# Patient Record
Sex: FEMALE | Race: BLACK OR AFRICAN AMERICAN | NOT HISPANIC OR LATINO
[De-identification: names, ages, dates, MRNs, and addresses within clinical notes are randomized per-mention and may not be internally consistent; named-entity substitution may affect disease eponyms.]

---

## 2017-01-23 PROBLEM — Z00.00 ENCOUNTER FOR PREVENTIVE HEALTH EXAMINATION: Noted: 2017-01-23

## 2017-01-26 ENCOUNTER — APPOINTMENT (OUTPATIENT)
Dept: PULMONOLOGY | Facility: CLINIC | Age: 82
End: 2017-01-26

## 2017-07-06 ENCOUNTER — HOSPITAL ENCOUNTER (OUTPATIENT)
Dept: HOSPITAL 74 - JER | Age: 82
Setting detail: OBSERVATION
LOS: 3 days | Discharge: HOME | End: 2017-07-09
Attending: INTERNAL MEDICINE | Admitting: INTERNAL MEDICINE
Payer: COMMERCIAL

## 2017-07-06 VITALS — BODY MASS INDEX: 20.9 KG/M2

## 2017-07-06 DIAGNOSIS — N39.0: ICD-10-CM

## 2017-07-06 DIAGNOSIS — M32.9: ICD-10-CM

## 2017-07-06 DIAGNOSIS — Z79.82: ICD-10-CM

## 2017-07-06 DIAGNOSIS — R06.00: ICD-10-CM

## 2017-07-06 DIAGNOSIS — I50.32: ICD-10-CM

## 2017-07-06 DIAGNOSIS — I35.0: ICD-10-CM

## 2017-07-06 DIAGNOSIS — M19.90: ICD-10-CM

## 2017-07-06 DIAGNOSIS — R06.02: ICD-10-CM

## 2017-07-06 DIAGNOSIS — Z86.73: ICD-10-CM

## 2017-07-06 DIAGNOSIS — J84.89: ICD-10-CM

## 2017-07-06 DIAGNOSIS — R07.9: ICD-10-CM

## 2017-07-06 DIAGNOSIS — R55: Primary | ICD-10-CM

## 2017-07-06 DIAGNOSIS — I10: ICD-10-CM

## 2017-07-06 DIAGNOSIS — E03.9: ICD-10-CM

## 2017-07-06 LAB
ALBUMIN SERPL-MCNC: 3.4 G/DL (ref 3.4–5)
ALP SERPL-CCNC: 99 U/L (ref 45–117)
ALT SERPL-CCNC: 15 U/L (ref 12–78)
ANION GAP SERPL CALC-SCNC: 10 MMOL/L (ref 8–16)
APPEARANCE UR: CLEAR
AST SERPL-CCNC: 28 U/L (ref 15–37)
BACTERIA #/AREA URNS HPF: (no result) /HPF
BASOPHILS # BLD: 2.2 % (ref 0–2)
BILIRUB SERPL-MCNC: 0.7 MG/DL (ref 0.2–1)
BILIRUB UR STRIP.AUTO-MCNC: NEGATIVE MG/DL
CALCIUM SERPL-MCNC: 8.3 MG/DL (ref 8.5–10.1)
CO2 SERPL-SCNC: 25 MMOL/L (ref 21–32)
COLOR UR: YELLOW
CREAT SERPL-MCNC: 0.9 MG/DL (ref 0.55–1.02)
DEPRECATED RDW RBC AUTO: 19.9 % (ref 11.6–15.6)
EOSINOPHIL # BLD: 0.3 % (ref 0–4.5)
GLUCOSE SERPL-MCNC: 79 MG/DL (ref 74–106)
INR BLD: 1.14 (ref 0.82–1.09)
KETONES UR QL STRIP: NEGATIVE
LEUKOCYTE ESTERASE UR QL STRIP.AUTO: NEGATIVE
MCH RBC QN AUTO: 25.6 PG (ref 25.7–33.7)
MCHC RBC AUTO-ENTMCNC: 33.7 G/DL (ref 32–36)
MCV RBC: 75.9 FL (ref 80–96)
MUCOUS THREADS URNS QL MICRO: (no result)
NEUTROPHILS # BLD: 80 % (ref 42.8–82.8)
NITRITE UR QL STRIP: POSITIVE
PH UR: 6 [PH] (ref 5–8)
PLATELET # BLD AUTO: 229 K/MM3 (ref 134–434)
PMV BLD: 9.2 FL (ref 7.5–11.1)
PROT SERPL-MCNC: 9.2 G/DL (ref 6.4–8.2)
PROT UR QL STRIP: NEGATIVE
PROT UR QL STRIP: NEGATIVE
PT PNL PPP: 12.6 SEC (ref 9.98–11.88)
RBC # BLD AUTO: 1 /HPF (ref 0–3)
RBC # UR STRIP: NEGATIVE /UL
SP GR UR: 1.02 (ref 1–1.02)
TROPONIN I SERPL-MCNC: < 0.02 NG/ML (ref 0–0.05)
UROBILINOGEN UR STRIP-MCNC: (no result) E.U./DL (ref 0.2–1)
WBC # BLD AUTO: 4.9 K/MM3 (ref 4–10)
WBC # UR AUTO: 1 /HPF (ref 3–5)

## 2017-07-06 PROCEDURE — G0378 HOSPITAL OBSERVATION PER HR: HCPCS

## 2017-07-06 NOTE — PDOC
History of Present Illness





<Anthony Taveras - Last Filed: 07/06/17 18:14>





- General


History Source: Patient, Family


Exam Limitations: No Limitations





- History of Present Illness


Initial Comments: 


07/06/17 16:51


The patient is an 83 year old female with a significant past medical history of 

hypertension, lupus, aortic stenosis, osteoarthritis, hypothyroidism, mild 

stroke, and interstitial lung disease, sent by PCP to the Emergency Department 

with presyncopal episode this morning. The patient reports that she woke up 

this morning, walked to the bathroom and felt as if she was going to pass out

. The patient reports that she got back into bed and experienced palpitations 

and shortness of breath. She admits that walking back to bed she had an 

unsteady gait. The patient reports that she has had night sweats for the past 

week. She also reports foul smelling urine. As per the patients daughter, the 

patient looked pale this morning. The patients daughter admits that the 

patient has a history of recurrent UTIs, most recently 2-3 months ago. The 

patient admits that she went to see Dr. Pineda at 11am after the incident, who 

was suspicious of an acute COPD exacerbation and suggested she come to the ED.





The patient denies fever, or cough. Patient denies chest pain. Patient denies 

dysuria, or urinary frequency. Patient denies loss of consciousness, or head 

trauma. Patient denies nausea, vomiting, and diarrhea.





PCP: Dr. Blaise Pineda


Cardio: Dr. Uribe








<Joselyn Segura - Last Filed: 07/06/17 18:21>





- General


Chief Complaint: Shortness of Breath


Stated Complaint: SOB (PCP SENT)


Time Seen by Provider: 07/06/17 15:48





Past History





- Past Medical History


Anemia: No


Asthma: No


Cancer: No


Cardiac Disorders: No


CVA: No


COPD: No


CHF: No


Dementia: No


Diabetes: No


GI Disorders: No


 Disorders: No


HTN: Yes


Hypercholesterolemia: No


Liver Disease: No


Seizures: No


Thyroid Disease: Yes


Lung CA: No (interstitial lung disease)


Other medical history: lupus, aortic stenosis





- Immunization History


Immunization Up to Date: Yes





- Psycho/Social/Smoking Cessation Hx


Suicidal Ideation: No


Smoking History: Never smoked


Have you smoked in the past 12 months: No


Hx Alcohol Use: No


Drug/Substance Use Hx: No


Substance Use Type: None





<Anthony Taveras - Last Filed: 07/06/17 18:14>





<Joselyn Segura - Last Filed: 07/06/17 18:21>





- Past Medical History


Allergies/Adverse Reactions: 


 Allergies











Allergy/AdvReac Type Severity Reaction Status Date / Time


 


No Known Allergies Allergy   Unverified 07/06/17 15:43











Home Medications: 


Ambulatory Orders





Aspirin [ASA -] 81 mg PO DAILY 11/01/16 


Diltiazem [Cardizem -] 180 mg PO BID 11/01/16 


Donepezil HCl 5 mg PO DAILY 11/01/16 


Furosemide 60 mg PO DAILY 11/01/16 


Hydralazine HCl 50 mg PO TID 11/01/16 


Isosorbide Mononitrate [Imdur -] 60 mg PO BID 11/01/16 


Levothyroxine [Synthroid -] 50 mcg PO DAILY 11/01/16 


Losartan Potassium 100 mg PO DAILY 11/01/16 


Hydroxychloroquine So4 [Plaquenil -] 200 mg PO DAILY 07/06/17 


Risedronate Sodium 150 mg PO MONTHLY 07/06/17 











**Review of Systems





- Review of Systems


Able to Perform ROS?: Yes


Comments:: 


07/06/17 16:52


GENERAL/CONSTITUTIONAL: + night sweats, + unsteady gait. No fever. No weakness.


HEAD, EYES, EARS, NOSE AND THROAT: No change in vision. No ear pain or 

discharge. No sore throat.


CARDIOVASCULAR: + palpitations, + shortness of breath. No chest pain.


RESPIRATORY: No cough, wheezing, or hemoptysis.


GASTROINTESTINAL: No nausea, vomiting, diarrhea or constipation.


GENITOURINARY: + foul smelling urine. No dysuria, or frequency.


MUSCULOSKELETAL: No joint or muscle swelling or pain. No neck or back pain.


SKIN: No rash


NEUROLOGIC: No headache, vertigo, loss of consciousness, or change in strength/

sensation.


ENDOCRINE: No increased thirst. No abnormal weight change.


HEMATOLOGIC/LYMPHATIC: No anemia, easy bleeding, or history of blood clots.


ALLERGIC/IMMUNOLOGIC: No hives or skin allergy. 





<Joselyn Segura - Last Filed: 07/06/17 18:21>





*Physical Exam





- Vital Signs


 Last Vital Signs











Temp Pulse Resp BP Pulse Ox


 


 97.1 F L  68   22   147/66   97 


 


 07/06/17 15:43  07/06/17 15:43  07/06/17 15:43  07/06/17 15:43  07/06/17 15:43














<Anthony Taveras - Last Filed: 07/06/17 18:14>





- Vital Signs


 Last Vital Signs











Temp Pulse Resp BP Pulse Ox


 


 97.1 F L  68   22   147/66   100 


 


 07/06/17 15:43  07/06/17 15:43  07/06/17 15:43  07/06/17 15:43  07/06/17 16:10














- Physical Exam


Comments: 


07/06/17 16:54


GENERAL: Awake, alert, and fully oriented, in no acute distress


HEAD: No signs of trauma


EYES: PERRLA, EOMI, sclera anicteric, conjunctiva clear


ENT: Auricles normal inspection, hearing grossly normal, nares patent, 

oropharynx clear without exudates. Moist mucosa


NECK: Normal ROM, supple, no lymphadenopathy, JVD, or masses


LUNGS: Breath sounds equal, clear to auscultation bilaterally.  No wheezes, and 

no crackles


HEART: Systolic murmur, regular rate and rhythm, normal S1 and S2, no murmurs, 

rubs or gallops


ABDOMEN: Soft, nontender, normoactive bowel sounds.  No guarding, no rebound.  

No masses


EXTREMITIES: Normal range of motion, no edema.  No clubbing or cyanosis. No 

cords, erythema, or tenderness


NEUROLOGICAL: Cranial nerves II through XII grossly intact.  Normal speech


SKIN: Warm, Dry, normal turgor, no rashes or lesions noted.





<Joselyn Segura - Last Filed: 07/06/17 18:21>





ED Treatment Course





- LABORATORY


CBC & Chemistry Diagram: 


 07/06/17 16:50





 07/06/17 16:50





<Anthony Taveras - Last Filed: 07/06/17 18:14>





- LABORATORY


CBC & Chemistry Diagram: 


 07/06/17 16:50





 07/06/17 16:50





- RADIOLOGY


Radiograph Interpretation: 





07/06/17 17:41


Chest XRay


As reviewed by Dr. Maurice Buitrago


IMPRESSION: Since prior chest xray, the cardiac silhouette remains slightly to 

moderately enlarged with unfolding of the aortic arch. Bilateral increased 

interstitial lung markings are again seen mainly involving the lower lobes. 

Superimposed infiltrates cannot be excluded. Mediastinum and visualized osseous 

structures appear intact. 





<Joselyn Segura - Last Filed: 07/06/17 18:21>





Medical Decision Making





- Medical Decision Making


07/06/17 18:06


Dr. Pineda was called at his office at 6:07. Awaiting call back.





07/06/17 18:11


Dr. Franklin returned call and spoke to Dr. Taveras about the patient's care. Dr. Franklin agreed to admit for chest pain, to admits under Tornillo's service, and 

Dr. Franklin will input orders. 





<Joselyn Segura - Last Filed: 07/06/17 18:21>





*DC/Admit/Observation/Transfer





- Discharge Dispostion


Admit: Yes





- Attestations


Physician Attestion: 





07/06/17 15:50








I, Dr. Anthony Taveras, attest that this document has been prepared under my 

direction and personally reviewed by me in its entirety.   I further attest, 

that it accurately reflects all work, treatment, procedures and medical decision

-making performed by me.  





<Anthony Taveras - Last Filed: 07/06/17 18:14>





- Attestations


Scribe Attestion: 


07/06/17 16:55


Documentation prepared by Joselyn Segura, acting as medical scribe for Anthony Taveras DO.





<Joselyn Segura - Last Filed: 07/06/17 18:21>


Diagnosis at time of Disposition: 


 Syncope, near, Hypothyroid





Chest pain


Qualifiers:


 Chest pain type: unspecified Qualified Code(s): R07.9 - Chest pain, unspecified





Dyspnea


Qualifiers:


 Dyspnea type: shortness of breath Qualified Code(s): R06.02 - Shortness of 

breath





- Discharge Dispostion


Condition at time of disposition: Unchanged/Unknown





- Referrals


Referrals: 


Blaise Pineda MD [Primary Care Provider] -

## 2017-07-07 RX ADMIN — HYDRALAZINE HYDROCHLORIDE SCH MG: 50 TABLET, FILM COATED ORAL at 13:48

## 2017-07-07 RX ADMIN — Medication SCH TAB: at 17:17

## 2017-07-07 RX ADMIN — CEFTRIAXONE SCH MLS/HR: 1 INJECTION, POWDER, FOR SOLUTION INTRAMUSCULAR; INTRAVENOUS at 17:17

## 2017-07-07 RX ADMIN — DILTIAZEM HYDROCHLORIDE SCH MG: 60 TABLET, FILM COATED ORAL at 09:55

## 2017-07-07 RX ADMIN — ISOSORBIDE MONONITRATE SCH MG: 60 TABLET, EXTENDED RELEASE ORAL at 21:08

## 2017-07-07 RX ADMIN — LEVOTHYROXINE SODIUM SCH MCG: 50 TABLET ORAL at 06:29

## 2017-07-07 RX ADMIN — HYDRALAZINE HYDROCHLORIDE SCH MG: 50 TABLET, FILM COATED ORAL at 06:29

## 2017-07-07 RX ADMIN — HYDROXYCHLOROQUINE SULFATE SCH MG: 200 TABLET, FILM COATED ORAL at 09:56

## 2017-07-07 RX ADMIN — DONEPEZIL HYDROCHLORIDE SCH MG: 5 TABLET, FILM COATED ORAL at 21:07

## 2017-07-07 RX ADMIN — ISOSORBIDE MONONITRATE SCH MG: 60 TABLET, EXTENDED RELEASE ORAL at 09:55

## 2017-07-07 RX ADMIN — DILTIAZEM HYDROCHLORIDE SCH MG: 60 TABLET, FILM COATED ORAL at 21:07

## 2017-07-07 RX ADMIN — ASPIRIN 81 MG SCH MG: 81 TABLET ORAL at 09:55

## 2017-07-07 RX ADMIN — HYDRALAZINE HYDROCHLORIDE SCH MG: 50 TABLET, FILM COATED ORAL at 21:07

## 2017-07-07 RX ADMIN — FUROSEMIDE SCH MG: 20 TABLET ORAL at 09:55

## 2017-07-07 RX ADMIN — LOSARTAN POTASSIUM SCH MG: 50 TABLET, FILM COATED ORAL at 09:55

## 2017-07-07 NOTE — HP
Admitting History and Physical





- Primary Care Physician


PCP: Blaise Pineda





- Admission


Chief Complaint: I felt weak


History of Present Illness: 


Ms Espinosa is a pleasant 83 year old female who comes in with lightheadedness. 

According to patient and daughter, she has non-specific complaints of not 

feeling well since 7/4. Patient cannot describe exactly what that meant earlier 

this week, but yesterday she says she began to feel very weak. She says she got 

up to make tea and began to feel lightheaded. She was able to make it back to 

the bed without falling, but she says she was weak as well. She was also short 

of breath. Her daughter saw her and felt her color was off, she was seen by Dr Pineda and sent in for further evaluation. Patient said she had L sided chest 

pain and daughter says yesterday she was having left sided flank pain. Aside 

from this she is without complaint. She denies fevers, chills, passing out, 

chest pain, coughing, abdominal pain, nausea, vomiting, diarrhea, constipation, 

or swelling. She is currently feeling better.


History Source: Patient


Limitations to Obtaining History: No Limitations





- Past Medical History


CNS: Yes: TIA


Cardiovascular: Yes: Aortic Stenosis, HTN


Pulmonary: Yes: Other (ILD)


Musculoskeletal: Yes: Osteoarthritis


Rheumatology: Yes: Lupus


Endocrine: Yes: Hypothyroidism





- Smoking History


Smoking history: Never smoked


Have you smoked in the past 12 months: No





- Alcohol/Substance Use


Hx Alcohol Use: No


History of Substance Use: reports: None





- Social History


Usual Living Arrangement: Yes: With Child


ADL: Family Assistance


Occupation: former nurse's aide


History of Recent Travel: No





Home Medications





- Allergies


Allergies/Adverse Reactions: 


 Allergies











Allergy/AdvReac Type Severity Reaction Status Date / Time


 


No Known Allergies Allergy   Unverified 07/06/17 15:43














- Home Medications


Home Medications: 


Ambulatory Orders





Aspirin [ASA -] 81 mg PO DAILY 11/01/16 


Diltiazem [Cardizem -] 180 mg PO BID 11/01/16 


Donepezil HCl 5 mg PO HS 11/01/16 


Furosemide 60 mg PO DAILY 11/01/16 


Hydralazine HCl 50 mg PO TID 11/01/16 


Isosorbide Mononitrate [Imdur -] 60 mg PO BID 11/01/16 


Levothyroxine [Synthroid -] 50 mcg PO DAILY 11/01/16 


Losartan Potassium 100 mg PO DAILY 11/01/16 


Hydroxychloroquine So4 [Plaquenil -] 200 mg PO DAILY 07/06/17 


Risedronate Sodium 150 mg PO MONTHLY 07/06/17 











Family Disease History





- Family Disease History


Family History: Unremarkable





Review of Systems


Findings/Remarks: 


Full review of systems obtained, as per HPI and otherwise negative.





Physical Examination


Vital Signs: 


 Vital Signs











Temperature  98.2 F   07/07/17 08:10


 


Pulse Rate  74   07/07/17 08:10


 


Respiratory Rate  16   07/07/17 08:10


 


Blood Pressure  160/90   07/07/17 08:10


 


O2 Sat by Pulse Oximetry (%)  96   07/07/17 10:00











Constitutional: Yes: Well Nourished, No Distress, Calm


Eyes: Yes: Conjunctiva Clear, EOM Intact, PERRL


HENT: Yes: Atraumatic, Normocephalic


Cardiovascular: Yes: Regular Rate and Rhythm.  No: Gallop, Murmur, Rub


Respiratory: Yes: Regular, Rhonchi (bibasilar).  No: Rales, Wheezes


Gastrointestinal: Yes: Normal Bowel Sounds, Soft.  No: Distention, Tenderness


Extremities: Yes: WNL


Edema: No


Labs: 


 Laboratory Results - last 24 hr











  07/06/17 07/06/17 07/06/17





  16:50 16:50 16:50


 


WBC  4.9  


 


RBC  4.21  


 


Hgb  10.8  


 


Hct  32.0 L  


 


MCV  75.9 L  


 


MCHC  33.7  


 


RDW  19.9 H  


 


Plt Count  229  


 


MPV  9.2  


 


Neutrophils %  80.0  D  


 


Lymphocytes %  11.8  D  


 


Monocytes %  5.7  


 


Eosinophils %  0.3  D  


 


Basophils %  2.2 H  


 


INR   1.14 


 


Sodium    140


 


Potassium    3.8


 


Chloride    105


 


Carbon Dioxide    25


 


Anion Gap    10


 


BUN    26 H


 


Creatinine    0.9


 


Creat Clearance w eGFR    59.80


 


Random Glucose    79


 


Lactic Acid   


 


Calcium    8.3 L


 


Total Bilirubin    0.7


 


AST    28  D


 


ALT    15


 


Alkaline Phosphatase    99


 


Creatine Kinase    90


 


Troponin I    < 0.02


 


B-Natriuretic Peptide    431.40


 


Total Protein    9.2 H


 


Albumin    3.4


 


Urine Color   


 


Urine Appearance   


 


Urine pH   


 


Ur Specific Gravity   


 


Urine Protein   


 


Urine Glucose (UA)   


 


Urine Ketones   


 


Urine Blood   


 


Urine Nitrite   


 


Urine Bilirubin   


 


Urine Urobilinogen   


 


Ur Leukocyte Esterase   


 


Urine RBC   


 


Urine WBC   


 


Urine Bacteria   


 


Urine Mucus   














  07/06/17 07/06/17





  17:16 17:30


 


WBC  


 


RBC  


 


Hgb  


 


Hct  


 


MCV  


 


MCHC  


 


RDW  


 


Plt Count  


 


MPV  


 


Neutrophils %  


 


Lymphocytes %  


 


Monocytes %  


 


Eosinophils %  


 


Basophils %  


 


INR  


 


Sodium  


 


Potassium  


 


Chloride  


 


Carbon Dioxide  


 


Anion Gap  


 


BUN  


 


Creatinine  


 


Creat Clearance w eGFR  


 


Random Glucose  


 


Lactic Acid  0.7 


 


Calcium  


 


Total Bilirubin  


 


AST  


 


ALT  


 


Alkaline Phosphatase  


 


Creatine Kinase  


 


Troponin I  


 


B-Natriuretic Peptide  


 


Total Protein  


 


Albumin  


 


Urine Color   Yellow


 


Urine Appearance   Clear


 


Urine pH   6.0


 


Ur Specific Gravity   1.020


 


Urine Protein   Negative


 


Urine Glucose (UA)   Negative


 


Urine Ketones   Negative


 


Urine Blood   Negative


 


Urine Nitrite   Positive


 


Urine Bilirubin   Negative


 


Urine Urobilinogen   0.2 e.u/dl


 


Ur Leukocyte Esterase   Negative


 


Urine RBC   1


 


Urine WBC   1


 


Urine Bacteria   Rare


 


Urine Mucus   Rare














Imaging





- Results


Chest X-ray: Report Reviewed, Image Reviewed





Problem List





- Problems


(1) Syncope, near


Assessment/Plan: 


-sounds orthostatic in nature


-agree with cardiology evaluation


-check orthostatic vital signs


-monitor


-PT consult


Code(s): R55 - SYNCOPE AND COLLAPSE





(2) Hypothyroid


Assessment/Plan: 


-continue synthroid


Code(s): E03.9 - HYPOTHYROIDISM, UNSPECIFIED   





(3) CVA (cerebral vascular accident)


Assessment/Plan: 


-stable


-continue aspirin


Code(s): I63.9 - CEREBRAL INFARCTION, UNSPECIFIED   





(4) Hypertension


Assessment/Plan: 


-continue hydralazine, diltiazem, and cozaar


-monitor, elevated on last check


Code(s): I10 - ESSENTIAL (PRIMARY) HYPERTENSION   





(5) Flank pain


Assessment/Plan: 


-urinalysis shows nitrites positive


-will check ultrasound


-continue rocephin empirically, day 2


Code(s): R10.9 - UNSPECIFIED ABDOMINAL PAIN

## 2017-07-07 NOTE — EKG
Test Reason : 

Blood Pressure : ***/*** mmHG

Vent. Rate : 075 BPM     Atrial Rate : 075 BPM

   P-R Int : 142 ms          QRS Dur : 096 ms

    QT Int : 420 ms       P-R-T Axes : -13 -34 -35 degrees

   QTc Int : 469 ms

 

SINUS RHYTHM WITH PREMATURE ATRIAL COMPLEXES

LEFT AXIS DEVIATION

NONSPECIFIC ST AND T WAVE ABNORMALITY

ABNORMAL ECG

 

Confirmed by PLACIDO WEBBER MD ([id number]) on 7/7/2017 9:01:38 AM

 

Referred By:             Confirmed By:PLACIDO WEBBER MD

## 2017-07-07 NOTE — CON.CARD
Cardiology Consult (text)





- Consultation


Consultation Note: 


cc:  lightheaded





hpi:  83 f hx htn, hypothyroid, sle, AS, dchf/le edema, palps, here with 

episode of presyncope.  Pt says yesterday she stood up to use bathroom and felt 

lightheaded.  She did not pass out or fall.  She sat back down and symptoms 

resolved.  She went to pmd and sent to ER for further eval.  Pt denies cp, sob, 

palps, loc, pnd, orthopnea, le edema.  Sees me for cardio.





pmh: per hpi


psh: nc


social: no tob


fam: no premature cad, scd


ros: per hpi; no fever, nvd, cough, nasal congestion, ha, vision changes, gib, 

hematuria, muscle pain


meds:


 





 Home Medications











 Medication  Instructions  Recorded


 


Aspirin [ASA -] 81 mg PO DAILY 11/01/16


 


Diltiazem [Cardizem -] 180 mg PO BID 11/01/16


 


Donepezil HCl 5 mg PO HS 11/01/16


 


Furosemide 60 mg PO DAILY 11/01/16


 


Hydralazine HCl 50 mg PO TID 11/01/16


 


Isosorbide Mononitrate [Imdur -] 60 mg PO BID 11/01/16


 


Levothyroxine [Synthroid -] 50 mcg PO DAILY 11/01/16


 


Losartan Potassium 100 mg PO DAILY 11/01/16


 


Hydroxychloroquine So4 [Plaquenil 200 mg PO DAILY 07/06/17





-]  


 


Risedronate Sodium 150 mg PO MONTHLY 07/06/17











pe:


 


 Vital Signs











 Period  Temp  Pulse  Resp  BP Sys/Alan  Pulse Ox


 


 Last 24 Hr  97.1 F-98.9 F  61-74  16-22  145-185/66-90  








nad no jvd


rrr s1s2 +as murmur, no rg


cta bl nl eff


aaox3


no le e/c/c


abd nt nd pos bs


no jaundice diaphoresis


pos dp pt no carotid bruits





 


 Laboratory Last Values











WBC  4.9 K/mm3 (4.0-10.0)   07/06/17  16:50    


 


RBC  4.21 M/mm3 (3.60-5.2)   07/06/17  16:50    


 


Hgb  10.8 GM/dL (10.7-15.3)   07/06/17  16:50    


 


Hct  32.0 % (32.4-45.2)  L  07/06/17  16:50    


 


MCV  75.9 fl (80-96)  L  07/06/17  16:50    


 


MCHC  33.7 g/dl (32.0-36.0)   07/06/17  16:50    


 


RDW  19.9 % (11.6-15.6)  H  07/06/17  16:50    


 


Plt Count  229 K/MM3 (134-434)   07/06/17  16:50    


 


MPV  9.2 fl (7.5-11.1)   07/06/17  16:50    


 


Neutrophils %  80.0 % (42.8-82.8)  D 07/06/17  16:50    


 


Lymphocytes %  11.8 % (8-40)  D 07/06/17  16:50    


 


Monocytes %  5.7 % (3.8-10.2)   07/06/17  16:50    


 


Eosinophils %  0.3 % (0-4.5)  D 07/06/17  16:50    


 


Basophils %  2.2 % (0-2.0)  H  07/06/17  16:50    


 


INR  1.14  (0.82-1.09)   07/06/17  16:50    


 


Sodium  140 mmol/L (136-145)   07/06/17  16:50    


 


Potassium  3.8 mmol/L (3.5-5.1)   07/06/17  16:50    


 


Chloride  105 mmol/L ()   07/06/17  16:50    


 


Carbon Dioxide  25 mmol/L (21-32)   07/06/17  16:50    


 


Anion Gap  10  (8-16)   07/06/17  16:50    


 


BUN  26 mg/dL (7-18)  H  07/06/17  16:50    


 


Creatinine  0.9 mg/dL (0.55-1.02)   07/06/17  16:50    


 


Creat Clearance w eGFR  59.80  (>60)   07/06/17  16:50    


 


Random Glucose  79 mg/dL ()   07/06/17  16:50    


 


Lactic Acid  0.7 mmol/L (0.4-2.0)   07/06/17  17:16    


 


Calcium  8.3 mg/dL (8.5-10.1)  L  07/06/17  16:50    


 


Total Bilirubin  0.7 mg/dL (0.2-1.0)   07/06/17  16:50    


 


AST  28 U/L (15-37)  D 07/06/17  16:50    


 


ALT  15 U/L (12-78)   07/06/17  16:50    


 


Alkaline Phosphatase  99 U/L ()   07/06/17  16:50    


 


Creatine Kinase  90 IU/L ()   07/06/17  16:50    


 


Troponin I  < 0.02 ng/ml (0.00-0.05)   07/06/17  16:50    


 


B-Natriuretic Peptide  431.40 pg/ml (5-450)   07/06/17  16:50    


 


Total Protein  9.2 g/dl (6.4-8.2)  H  07/06/17  16:50    


 


Albumin  3.4 g/dl (3.4-5.0)   07/06/17  16:50    


 


Urine Color  Yellow   07/06/17  17:30    


 


Urine Appearance  Clear   07/06/17  17:30    


 


Urine pH  6.0  (5.0-8.0)   07/06/17  17:30    


 


Ur Specific Gravity  1.020  (1.005-1.025)   07/06/17  17:30    


 


Urine Protein  Negative  (NEGATIVE)   07/06/17  17:30    


 


Urine Glucose (UA)  Negative  (NEGATIVE)   07/06/17  17:30    


 


Urine Ketones  Negative  (NEGATIVE)   07/06/17  17:30    


 


Urine Blood  Negative  (NEGATIVE)   07/06/17  17:30    


 


Urine Nitrite  Positive  (NEGATIVE)   07/06/17  17:30    


 


Urine Bilirubin  Negative  (NEGATIVE)   07/06/17  17:30    


 


Urine Urobilinogen  0.2 e.u/dl E.U./dl (0.2-1.0)   07/06/17  17:30    


 


Ur Leukocyte Esterase  Negative  (NEGATIVE)   07/06/17  17:30    


 


Urine RBC  1 /hpf (0-3)   07/06/17  17:30    


 


Urine WBC  1 /hpf (3-5)   07/06/17  17:30    


 


Urine Bacteria  Rare /hpf (NONE SEEN)   07/06/17  17:30    


 


Urine Mucus  Rare   07/06/17  17:30    














ecg 7/6/17:  sr, pacs, nl intervals, no ischemic changes





cxr: no chf





echo 9/2016:  nl lv/rv, mild lae, mild ar, mod AS, mac, mild tr, mild phtn





echo 11/2016:  nl lv/rv, mod gustavo, rvsp 40-50, mod tr, mod as, mild-mod ar, mild-

mod pr





event monitor (2 weeks) 9/2016:  benign, occ pacs, pvcs





carotids 7/2015:  small plaque, no sig stenosis





mibi 8/2015:  no ischemia





tele:  sr, pacs








a/p:  83 f hx htn, hypothyroid, sle, AS, dchf/le edema, palps, here with 

episode of presyncope.








presyncope:


-seems orthostatic based on description


-recent carotid US unremarkable when admitted here 11/2016


-echo 11/2016 no sig change from prior


-no signs acs or chf


-will check updated echo to see if any change in severity of AS


-will order orthostatic VS's


-monitor on tele 24 hours


-infection w/u per pmd





htn:


-cont home meds





AS:


-11/2016 echo with moderate AS, will check updated echo here given her 

presyncope episode





chronic diastolic chf:


-stable, no gross vol overload, cont home po lasix





palps:


-chronic symptom likely due to occasional pacs and pvcs seen on outpt holter 

and event monitor


-normal lvef


-has not been bothering pt, monitor for now

## 2017-07-08 LAB
ANION GAP SERPL CALC-SCNC: 9 MMOL/L (ref 8–16)
BASOPHILS # BLD: 0.6 % (ref 0–2)
CALCIUM SERPL-MCNC: 7.8 MG/DL (ref 8.5–10.1)
CO2 SERPL-SCNC: 27 MMOL/L (ref 21–32)
CREAT SERPL-MCNC: 0.8 MG/DL (ref 0.55–1.02)
DEPRECATED RDW RBC AUTO: 19.3 % (ref 11.6–15.6)
EOSINOPHIL # BLD: 6.7 % (ref 0–4.5)
GLUCOSE SERPL-MCNC: 75 MG/DL (ref 74–106)
MAGNESIUM SERPL-MCNC: 2.1 MG/DL (ref 1.8–2.4)
MCH RBC QN AUTO: 26 PG (ref 25.7–33.7)
MCHC RBC AUTO-ENTMCNC: 34.3 G/DL (ref 32–36)
MCV RBC: 75.9 FL (ref 80–96)
NEUTROPHILS # BLD: 59.9 % (ref 42.8–82.8)
PHOSPHATE SERPL-MCNC: 2.9 MG/DL (ref 2.5–4.9)
PLATELET # BLD AUTO: 199 K/MM3 (ref 134–434)
PMV BLD: 9 FL (ref 7.5–11.1)
WBC # BLD AUTO: 6.3 K/MM3 (ref 4–10)

## 2017-07-08 RX ADMIN — FUROSEMIDE SCH MG: 20 TABLET ORAL at 10:25

## 2017-07-08 RX ADMIN — ISOSORBIDE MONONITRATE SCH MG: 60 TABLET, EXTENDED RELEASE ORAL at 10:25

## 2017-07-08 RX ADMIN — ISOSORBIDE MONONITRATE SCH MG: 60 TABLET, EXTENDED RELEASE ORAL at 21:10

## 2017-07-08 RX ADMIN — DILTIAZEM HYDROCHLORIDE SCH MG: 60 TABLET, FILM COATED ORAL at 21:10

## 2017-07-08 RX ADMIN — CEFTRIAXONE SCH MLS/HR: 1 INJECTION, POWDER, FOR SOLUTION INTRAMUSCULAR; INTRAVENOUS at 10:25

## 2017-07-08 RX ADMIN — HYDRALAZINE HYDROCHLORIDE SCH MG: 50 TABLET, FILM COATED ORAL at 06:16

## 2017-07-08 RX ADMIN — HYDRALAZINE HYDROCHLORIDE SCH MG: 50 TABLET, FILM COATED ORAL at 21:10

## 2017-07-08 RX ADMIN — DONEPEZIL HYDROCHLORIDE SCH MG: 5 TABLET, FILM COATED ORAL at 21:09

## 2017-07-08 RX ADMIN — Medication SCH TAB: at 10:24

## 2017-07-08 RX ADMIN — ASPIRIN 81 MG SCH MG: 81 TABLET ORAL at 10:24

## 2017-07-08 RX ADMIN — LEVOTHYROXINE SODIUM SCH MCG: 50 TABLET ORAL at 06:16

## 2017-07-08 RX ADMIN — DILTIAZEM HYDROCHLORIDE SCH MG: 60 TABLET, FILM COATED ORAL at 10:24

## 2017-07-08 RX ADMIN — HYDRALAZINE HYDROCHLORIDE SCH MG: 50 TABLET, FILM COATED ORAL at 15:24

## 2017-07-08 RX ADMIN — LOSARTAN POTASSIUM SCH MG: 50 TABLET, FILM COATED ORAL at 10:24

## 2017-07-08 RX ADMIN — HYDROXYCHLOROQUINE SULFATE SCH MG: 200 TABLET, FILM COATED ORAL at 15:24

## 2017-07-08 NOTE — PN
Progress Note, Physician


History of Present Illness: 


Feeling a little better, but has not gotten out of bed yet this morning.  Urine 

culture returning with >100,000 GNB





- Current Medication List


Current Medications: 


Active Medications





Aspirin (Asa -)  81 mg PO DAILY [facility]


   Last Admin: 07/07/17 09:55 Dose:  81 mg


Diltiazem HCl (Cardizem -)  180 mg PO BID [facility]


   Last Admin: 07/07/17 21:07 Dose:  180 mg


Donepezil HCl (Aricept -)  5 mg PO HS [facility]


   Last Admin: 07/07/17 21:07 Dose:  5 mg


Furosemide (Lasix -)  60 mg PO DAILY [facility]


   Last Admin: 07/07/17 09:55 Dose:  60 mg


Hydralazine HCl (Apresoline -)  50 mg PO TID [facility]


   Last Admin: 07/08/17 06:16 Dose:  50 mg


Hydroxychloroquine Sulfate (Plaquenil -)  200 mg PO DAILY [facility]


   Last Admin: 07/07/17 09:56 Dose:  200 mg


Ceftriaxone Sodium (Rocephin 1gm Ivpb (Pre-Docked))  50 mls @ 100 mls/hr IVPB 

DAILY [facility]


   Last Admin: 07/07/17 17:17 Dose:  100 mls/hr


Isosorbide Mononitrate (Imdur -)  60 mg PO BID [facility]


   Last Admin: 07/07/17 21:08 Dose:  60 mg


Lactobacillus Acidophilus (Bacid -)  1 tab PO DAILY [facility]


   Last Admin: 07/07/17 17:17 Dose:  1 tab


Levothyroxine Sodium (Synthroid -)  50 mcg PO AM [facility]


   Last Admin: 07/08/17 06:16 Dose:  50 mcg


Losartan Potassium (Cozaar -)  100 mg PO DAILY [facility]


   Last Admin: 07/07/17 09:55 Dose:  100 mg











- Objective


Vital Signs: 


 Vital Signs











Temperature  98.8 F   07/08/17 01:59


 


Pulse Rate  65   07/08/17 05:10


 


Respiratory Rate  20   07/08/17 05:27


 


Blood Pressure  156/95   07/08/17 05:10


 


O2 Sat by Pulse Oximetry (%)  98   07/08/17 05:27











Constitutional: Yes: No Distress, Calm


Eyes: Yes: Conjunctiva Clear, EOM Intact


HENT: Yes: Atraumatic, Normocephalic


Cardiovascular: Yes: Regular Rate and Rhythm, S1, S2.  No: Murmur


Respiratory: Yes: Regular, CTA Bilaterally.  No: Rales, Rhonchi, Wheezes


Gastrointestinal: Yes: Normal Bowel Sounds, Soft.  No: Distention, Tenderness


Edema: No


Neurological: Yes: Alert, Oriented


Labs: 


 CBC, BMP





 07/08/17 06:00 





 07/08/17 06:00 





 INR, PTT











INR  1.14  (0.82-1.09)   07/06/17  16:50    














Assessment/Plan


Current Active Problems





Chest pain (Acute) 


Dyspnea (Acute) 


Flank pain (Acute) 


Hypothyroid (Acute) 


Syncope, near (Acute)


Urinary tract infection





-cont abx


-cardio to eval today


-if remains asymptomatic will discharge in AM (gettign IV abx currently) 


-discussed with patient's dtr

## 2017-07-08 NOTE — PN
Progress Note (short form)





- Note


Progress Note: 


cc:  lightheaded








S:  no complaints.  dizziness improved.  no cp, palps, sob





 Current Medications





Aspirin (Asa -)  81 mg PO DAILY [facility]


   Last Admin: 07/08/17 10:24 Dose:  81 mg


Diltiazem HCl (Cardizem -)  180 mg PO BID [facility]


   Last Admin: 07/08/17 21:10 Dose:  180 mg


Donepezil HCl (Aricept -)  5 mg PO HS [facility]


   Last Admin: 07/08/17 21:09 Dose:  5 mg


Furosemide (Lasix -)  60 mg PO DAILY [facility]


   Last Admin: 07/08/17 10:25 Dose:  60 mg


Hydralazine HCl (Apresoline -)  50 mg PO TID [facility]


   Last Admin: 07/08/17 21:10 Dose:  50 mg


Hydroxychloroquine Sulfate (Plaquenil -)  200 mg PO DAILY [facility]


   Last Admin: 07/08/17 15:24 Dose:  200 mg


Ceftriaxone Sodium (Rocephin 1gm Ivpb (Pre-Docked))  50 mls @ 100 mls/hr IVPB 

DAILY [facility]


   Last Admin: 07/08/17 10:25 Dose:  100 mls/hr


Isosorbide Mononitrate (Imdur -)  60 mg PO BID [facility]


   Last Admin: 07/08/17 21:10 Dose:  60 mg


Lactobacillus Acidophilus (Bacid -)  1 tab PO DAILY [facility]


   Last Admin: 07/08/17 10:24 Dose:  1 tab


Levothyroxine Sodium (Synthroid -)  50 mcg PO AM [facility]


   Last Admin: 07/08/17 06:16 Dose:  50 mcg


Losartan Potassium (Cozaar -)  100 mg PO DAILY [facility]


   Last Admin: 07/08/17 10:24 Dose:  100 mg





 Vital Signs - 24 hr











  07/07/17 07/08/17 07/08/17





  22:35 01:59 05:10


 


Temperature  98.8 F 


 


Pulse Rate  73 


 


Pulse Rate [   76





Right side   





Sitting]   


 


Pulse Rate [   72





Right side   





Standing]   


 


Pulse Rate [   65





Right side   





Supine]   


 


Respiratory 20 20 





Rate   


 


Blood Pressure  139/74 


 


Blood Pressure   164/82





[Right side   





Sitting]   


 


Blood Pressure   171/84





[Right side   





Standing]   


 


Blood Pressure   156/95





[Right side   





Supine]   


 


O2 Sat by Pulse 98  





Oximetry (%)   














  07/08/17 07/08/17 07/08/17





  05:27 10:23 15:00


 


Temperature  98 F 


 


Pulse Rate  71 


 


Pulse Rate [   





Right side   





Sitting]   


 


Pulse Rate [   





Right side   





Standing]   


 


Pulse Rate [   





Right side   





Supine]   


 


Respiratory 20 20 20





Rate   


 


Blood Pressure  149/70 


 


Blood Pressure   





[Right side   





Sitting]   


 


Blood Pressure   





[Right side   





Standing]   


 


Blood Pressure   





[Right side   





Supine]   


 


O2 Sat by Pulse 98  98





Oximetry (%)   














  07/08/17 07/08/17 07/08/17





  15:28 15:34 17:00


 


Temperature 98 F 98.7 F 98.0 F


 


Pulse Rate 77 84 72


 


Pulse Rate [   





Right side   





Sitting]   


 


Pulse Rate [   





Right side   





Standing]   


 


Pulse Rate [   





Right side   





Supine]   


 


Respiratory 20  18





Rate   


 


Blood Pressure 130/85 153/68 146/97


 


Blood Pressure   





[Right side   





Sitting]   


 


Blood Pressure   





[Right side   





Standing]   


 


Blood Pressure   





[Right side   





Supine]   


 


O2 Sat by Pulse   





Oximetry (%)   














  07/08/17 07/08/17





  21:00 22:11


 


Temperature 98.3 F 


 


Pulse Rate 75 


 


Pulse Rate [  





Right side  





Sitting]  


 


Pulse Rate [  





Right side  





Standing]  


 


Pulse Rate [  





Right side  





Supine]  


 


Respiratory 18 18





Rate  


 


Blood Pressure 143/89 


 


Blood Pressure  





[Right side  





Sitting]  


 


Blood Pressure  





[Right side  





Standing]  


 


Blood Pressure  





[Right side  





Supine]  


 


O2 Sat by Pulse  98





Oximetry (%)  








 Intake & Output











 07/06/17 07/07/17 07/08/17 07/09/17





 07:59 07:59 07:59 07:59


 


Intake Total   750 460


 


Balance   750 460


 


Weight  125 lb 126 lb 











nad no jvd


rrr s1s2 +as murmur, no rg


cta bl nl eff


aaox3


no le e/c/c


abd nt nd pos bs


no jaundice diaphoresis


pos dp pt no carotid bruits





 


 


 CBC, BMP





 07/08/17 06:00 





 07/08/17 06:00 











ecg 7/6/17:  sr, pacs, nl intervals, no ischemic changes





cxr: no chf





echo 9/2016:  nl lv/rv, mild lae, mild ar, mod AS, mac, mild tr, mild phtn





echo 11/2016:  nl lv/rv, mod gustavo, rvsp 40-50, mod tr, mod as, mild-mod ar, mild-

mod pr





event monitor (2 weeks) 9/2016:  benign, occ pacs, pvcs





carotids 7/2015:  small plaque, no sig stenosis





mibi 8/2015:  no ischemia





tele:  sr, frequent pacs








a/p:  83 f hx htn, hypothyroid, sle, AS, dchf/le edema, palps, here with 

episode of presyncope.








presyncope:


-seems orthostatic based on description, but orthostatics negative.  


-recent carotid US unremarkable when admitted here 11/2016


-echo 11/2016 no sig change from prior


-no signs acs or chf


-repeat echo without worsening of AS


-monitor on tele 24 hours


-infection w/u per pmd --> possible uti





htn:


-cont home meds





AS:


-11/2016 echo with moderate AS, will check updated echo here given her 

presyncope episode





chronic diastolic chf:


-stable, no gross vol overload, cont home po lasix.  orthostatics negative, nl 

po intake.  





palps/frequent pacs


-chronic symptom likely due to occasional pacs and pvcs seen on outpt holter 

and event monitor


-normal lvef


-has not been bothering pt, monitor for now


- lyte repletion





stable from CV perspective,  If plan is d/c tomorrow, ok from CV perspective.  

If patient stays tomorrow would, ok to d/c telemetry.   


Outpatient cardiology follow up.

## 2017-07-09 VITALS — DIASTOLIC BLOOD PRESSURE: 64 MMHG | TEMPERATURE: 98.8 F | HEART RATE: 74 BPM | SYSTOLIC BLOOD PRESSURE: 134 MMHG

## 2017-07-09 LAB
ANION GAP SERPL CALC-SCNC: 8 MMOL/L (ref 8–16)
BASOPHILS # BLD: 0.8 % (ref 0–2)
CALCIUM SERPL-MCNC: 7.4 MG/DL (ref 8.5–10.1)
CO2 SERPL-SCNC: 25 MMOL/L (ref 21–32)
CREAT SERPL-MCNC: 0.8 MG/DL (ref 0.55–1.02)
DEPRECATED RDW RBC AUTO: 19.7 % (ref 11.6–15.6)
EOSINOPHIL # BLD: 10.8 % (ref 0–4.5)
GLUCOSE SERPL-MCNC: 78 MG/DL (ref 74–106)
MCH RBC QN AUTO: 25.8 PG (ref 25.7–33.7)
MCHC RBC AUTO-ENTMCNC: 33.7 G/DL (ref 32–36)
MCV RBC: 76.6 FL (ref 80–96)
NEUTROPHILS # BLD: 52.9 % (ref 42.8–82.8)
PLATELET # BLD AUTO: 182 K/MM3 (ref 134–434)
PMV BLD: 9 FL (ref 7.5–11.1)
WBC # BLD AUTO: 5.8 K/MM3 (ref 4–10)

## 2017-07-09 RX ADMIN — Medication SCH TAB: at 09:10

## 2017-07-09 RX ADMIN — HYDRALAZINE HYDROCHLORIDE SCH MG: 50 TABLET, FILM COATED ORAL at 06:46

## 2017-07-09 RX ADMIN — ASPIRIN 81 MG SCH MG: 81 TABLET ORAL at 09:10

## 2017-07-09 RX ADMIN — HYDROXYCHLOROQUINE SULFATE SCH MG: 200 TABLET, FILM COATED ORAL at 09:36

## 2017-07-09 RX ADMIN — LOSARTAN POTASSIUM SCH MG: 50 TABLET, FILM COATED ORAL at 09:10

## 2017-07-09 RX ADMIN — ISOSORBIDE MONONITRATE SCH MG: 60 TABLET, EXTENDED RELEASE ORAL at 09:10

## 2017-07-09 RX ADMIN — LEVOTHYROXINE SODIUM SCH MCG: 50 TABLET ORAL at 06:46

## 2017-07-09 RX ADMIN — CEFTRIAXONE SCH MLS/HR: 1 INJECTION, POWDER, FOR SOLUTION INTRAMUSCULAR; INTRAVENOUS at 09:10

## 2017-07-09 RX ADMIN — DILTIAZEM HYDROCHLORIDE SCH MG: 60 TABLET, FILM COATED ORAL at 09:10

## 2017-07-09 RX ADMIN — FUROSEMIDE SCH MG: 20 TABLET ORAL at 09:10

## 2017-07-09 NOTE — DS
Physical Examination


Vital Signs: 


 Vital Signs











Temperature  99.3 F   07/09/17 02:00


 


Pulse Rate  67   07/09/17 05:25


 


Respiratory Rate  18   07/09/17 02:00


 


Blood Pressure  138/74   07/09/17 05:25


 


O2 Sat by Pulse Oximetry (%)  98   07/08/17 22:11











Constitutional: Yes: Well Nourished, No Distress, Calm


Eyes: Yes: Conjunctiva Clear, EOM Intact, PERRL


HENT: Yes: Atraumatic, Normocephalic


Neck: Yes: Supple, Trachea Midline


Cardiovascular: Yes: Regular Rate and Rhythm, S1, S2.  No: Murmur


Respiratory: Yes: Regular, CTA Bilaterally.  No: Rales, Rhonchi, Wheezes


Gastrointestinal: Yes: Normal Bowel Sounds, Soft.  No: Distention, Tenderness


Edema: No


Neurological: Yes: Alert, Oriented


Labs: 


 CBC, BMP





 07/09/17 05:40 











Discharge Summary


Reason For Visit: DYSPNEA/PRE SYNCOPE/WEAKNESS


Current Active Problems





Chest pain (Acute) 


Dyspnea (Acute) 


Flank pain (Acute) 


Hypothyroid (Acute) 


Syncope, near (Acute) 








Hospital Course: 


82 yo female admitted with presyncope, found to have UTI.  Was slightly 

orthostatic on exam, but symptoms improved with antibiiotics.  Seen by 

cardiology and had repeat echo to rule out worsening of known aortic stenosis, 

but echo showed stable findings with AS.  Has completed 3 days of IV rocephin 

and to discharge home with PO cipro for 3 more days.


Condition: Unchanged/Unknown





- Instructions


Referrals: 


Blaise Pineda MD [Primary Care Provider] - 


Disposition: HOME





- Home Medications


Comprehensive Discharge Medication List: 


Ambulatory Orders





Aspirin [ASA -] 81 mg PO DAILY 11/01/16 


Diltiazem [Cardizem -] 180 mg PO BID 11/01/16 


Donepezil HCl 5 mg PO HS 11/01/16 


Furosemide 60 mg PO DAILY 11/01/16 


Hydralazine HCl 50 mg PO TID 11/01/16 


Isosorbide Mononitrate [Imdur -] 60 mg PO BID 11/01/16 


Levothyroxine [Synthroid -] 50 mcg PO DAILY 11/01/16 


Losartan Potassium 100 mg PO DAILY 11/01/16 


Hydroxychloroquine So4 [Plaquenil -] 200 mg PO DAILY 07/06/17 


Risedronate Sodium 150 mg PO MONTHLY 07/06/17 


Ciprofloxacin HCl 500 mg PO BID #6 tablet 07/09/17

## 2018-09-21 ENCOUNTER — HOSPITAL ENCOUNTER (OUTPATIENT)
Dept: HOSPITAL 74 - JER | Age: 83
Setting detail: OBSERVATION
LOS: 5 days | Discharge: HOME HEALTH | End: 2018-09-26
Attending: INTERNAL MEDICINE | Admitting: INTERNAL MEDICINE
Payer: COMMERCIAL

## 2018-09-21 VITALS — BODY MASS INDEX: 27.5 KG/M2

## 2018-09-21 DIAGNOSIS — D64.9: ICD-10-CM
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
## 2018-09-21 DIAGNOSIS — E03.9: ICD-10-CM

## 2018-09-21 DIAGNOSIS — R41.89: ICD-10-CM

## 2018-09-21 DIAGNOSIS — M25.462: ICD-10-CM

## 2018-09-21 DIAGNOSIS — M32.9: ICD-10-CM

## 2018-09-21 DIAGNOSIS — I35.0: ICD-10-CM

## 2018-09-21 DIAGNOSIS — F03.90: ICD-10-CM

## 2018-09-21 DIAGNOSIS — Z86.73: ICD-10-CM

## 2018-09-21 DIAGNOSIS — M17.12: ICD-10-CM

## 2018-09-21 DIAGNOSIS — Z91.81: ICD-10-CM

## 2018-09-21 DIAGNOSIS — R55: Primary | ICD-10-CM
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
## 2018-09-21 DIAGNOSIS — N17.9: ICD-10-CM

## 2018-09-21 DIAGNOSIS — I11.0: ICD-10-CM

## 2018-09-21 DIAGNOSIS — Z79.82: ICD-10-CM

## 2018-09-21 DIAGNOSIS — I50.9: ICD-10-CM

## 2018-09-21 DIAGNOSIS — E86.0: ICD-10-CM
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
## 2018-09-21 LAB
ALBUMIN SERPL-MCNC: 3.4 G/DL (ref 3.4–5)
ALP SERPL-CCNC: 91 U/L (ref 45–117)
ALT SERPL-CCNC: 22 U/L (ref 13–61)
ANION GAP SERPL CALC-SCNC: 9 MMOL/L (ref 8–16)
APPEARANCE UR: CLEAR
AST SERPL-CCNC: 33 U/L (ref 15–37)
BASOPHILS # BLD: 1.2 % (ref 0–2)
BILIRUB SERPL-MCNC: 0.5 MG/DL (ref 0.2–1)
BILIRUB UR STRIP.AUTO-MCNC: NEGATIVE MG/DL
BUN SERPL-MCNC: 41 MG/DL (ref 7–18)
CALCIUM SERPL-MCNC: 9 MG/DL (ref 8.5–10.1)
CHLORIDE SERPL-SCNC: 107 MMOL/L (ref 98–107)
CO2 SERPL-SCNC: 27 MMOL/L (ref 21–32)
COLOR UR: (no result)
CREAT SERPL-MCNC: 1.3 MG/DL (ref 0.55–1.3)
DEPRECATED RDW RBC AUTO: 20.1 % (ref 11.6–15.6)
EOSINOPHIL # BLD: 4 % (ref 0–4.5)
GLUCOSE SERPL-MCNC: 90 MG/DL (ref 74–106)
HCT VFR BLD CALC: 31.4 % (ref 32.4–45.2)
HGB BLD-MCNC: 10.9 GM/DL (ref 10.7–15.3)
KETONES UR QL STRIP: NEGATIVE
LEUKOCYTE ESTERASE UR QL STRIP.AUTO: NEGATIVE
LYMPHOCYTES # BLD: 13 % (ref 8–40)
MCH RBC QN AUTO: 27.7 PG (ref 25.7–33.7)
MCHC RBC AUTO-ENTMCNC: 34.6 G/DL (ref 32–36)
MCV RBC: 79.9 FL (ref 80–96)
MONOCYTES # BLD AUTO: 9.5 % (ref 3.8–10.2)
NEUTROPHILS # BLD: 72.3 % (ref 42.8–82.8)
NITRITE UR QL STRIP: NEGATIVE
PH UR: 6 [PH] (ref 5–8)
PLATELET # BLD AUTO: 201 K/MM3 (ref 134–434)
PMV BLD: 8.8 FL (ref 7.5–11.1)
POTASSIUM SERPLBLD-SCNC: 4 MMOL/L (ref 3.5–5.1)
PROT SERPL-MCNC: 9.3 G/DL (ref 6.4–8.2)
PROT UR QL STRIP: NEGATIVE
PROT UR QL STRIP: NEGATIVE
RBC # BLD AUTO: 3.94 M/MM3 (ref 3.6–5.2)
SODIUM SERPL-SCNC: 142 MMOL/L (ref 136–145)
SP GR UR: 1.02 (ref 1–1.03)
UROBILINOGEN UR STRIP-MCNC: NEGATIVE MG/DL (ref 0.2–1)
WBC # BLD AUTO: 8.2 K/MM3 (ref 4–10)

## 2018-09-21 PROCEDURE — G0378 HOSPITAL OBSERVATION PER HR: HCPCS

## 2018-09-21 PROCEDURE — 3E0337Z INTRODUCTION OF ELECTROLYTIC AND WATER BALANCE SUBSTANCE INTO PERIPHERAL VEIN, PERCUTANEOUS APPROACH: ICD-10-PCS | Performed by: INTERNAL MEDICINE

## 2018-09-21 RX ADMIN — HYDRALAZINE HYDROCHLORIDE SCH MG: 50 TABLET, FILM COATED ORAL at 21:00

## 2018-09-21 RX ADMIN — DONEPEZIL HYDROCHLORIDE SCH MG: 5 TABLET, FILM COATED ORAL at 21:00

## 2018-09-21 RX ADMIN — ISOSORBIDE MONONITRATE SCH MG: 60 TABLET, EXTENDED RELEASE ORAL at 20:59

## 2018-09-21 RX ADMIN — HYDRALAZINE HYDROCHLORIDE SCH MG: 50 TABLET, FILM COATED ORAL at 15:33

## 2018-09-21 NOTE — EKG
Test Reason : 

Blood Pressure : ***/*** mmHG

Vent. Rate : 072 BPM     Atrial Rate : 072 BPM

   P-R Int : 162 ms          QRS Dur : 100 ms

    QT Int : 418 ms       P-R-T Axes : -06 -27 052 degrees

   QTc Int : 457 ms

 

SINUS RHYTHM WITH PREMATURE ATRIAL COMPLEXES

VOLTAGE CRITERIA FOR LEFT VENTRICULAR HYPERTROPHY

NONSPECIFIC T WAVE ABNORMALITY

ABNORMAL ECG

WHEN COMPARED WITH ECG OF 06-JUL-2017 16:00,

NO SIGNIFICANT CHANGE WAS FOUND

Confirmed by MIKA DELGADO MD ([id number]) on 9/21/2018 8:05:49 AM

 

Referred By:             Confirmed By:MIKA DELGADO MD

## 2018-09-21 NOTE — PDOC
History of Present Illness





- General


Chief Complaint: Pain, Acute


Stated Complaint: FALL


Time Seen by Provider: 09/21/18 02:59





- History of Present Illness


Initial Comments: 





09/21/18 05:03


The patient is an 85 year old female with a history of HTN, Thyroid Disease who 

presents for evaluation following an unwitnessed fall.  The patient is 

accompanied by family who assists with providing the history.  They report that 

the patient had an unwitnessed fall earlier this morning with unclear head 

trauma.  The patient states that she does not remember the fall nor how she 

fell.  The patient's family noted that the patient was appearing more altered 

prior to her fall, although she has returned to her baseline.  The patient 

otherwise denies fevers, chills, SOB, chest pain, nausea, vomiting, abdominal 

pain, or changes with urination or bowel movements.





Past History





- Past Medical History


Allergies/Adverse Reactions: 


 Allergies











Allergy/AdvReac Type Severity Reaction Status Date / Time


 


No Known Allergies Allergy   Unverified 07/06/17 15:43











Home Medications: 


Ambulatory Orders





Aspirin [ASA -] 81 mg PO DAILY 11/01/16 


Diltiazem [Cardizem -] 180 mg PO BID 11/01/16 


Donepezil HCl 5 mg PO HS 11/01/16 


Furosemide 60 mg PO DAILY 11/01/16 


Hydralazine HCl 50 mg PO TID 11/01/16 


Isosorbide Mononitrate [Imdur -] 60 mg PO BID 11/01/16 


Levothyroxine [Synthroid -] 50 mcg PO DAILY 11/01/16 


Losartan Potassium 100 mg PO DAILY 11/01/16 


Hydroxychloroquine So4 [Plaquenil -] 200 mg PO DAILY 07/06/17 


Risedronate Sodium 150 mg PO MONTHLY 07/06/17 








Anemia: No


Asthma: No


Cancer: No


Cardiac Disorders: No


CVA: No


COPD: No


CHF: No


Dementia: No


Diabetes: No


GI Disorders: No


 Disorders: No


HTN: Yes


Hypercholesterolemia: No


Liver Disease: No


Seizures: No


Thyroid Disease: Yes


Lung CA: No (interstitial lung disease)





- Immunization History


Immunization Up to Date: Yes





- Suicide/Smoking/Psychosocial Hx


Smoking History: Never smoked


Have you smoked in the past 12 months: No


Information on smoking cessation initiated: No


Hx Alcohol Use: No


Drug/Substance Use Hx: No


Substance Use Type: None





**Review of Systems





- Review of Systems


Comments:: 





09/21/18 05:19


Constitutional: No fevers, chills, fatigue, malaise


HEENT: No Rhinorrhea, nasal congestion, visual changes


Cardiovascular: Syncope.  No chest pain, palpitations, lightheadedness


Respiratory: No Cough, SOB, Hemoptysis,


Gastrointestinal: No Abdominal pain, Nausea, Vomiting, Constipation, Diarrhea, 

Melena


Genitourinary: No Dysuria, Frequency, Urgency, Hesitancy, Hematuria, Flank pain


Musculoskeletal: No Myalgia, arthralgia


Skin: No rashes, itching, bruising, pallor


Neurologic: No Headache, Dizziness, Numbness, Weakness, or Tingling


Psychiatric: No Hallucinations. No SI or HI








*Physical Exam





- Vital Signs


 Last Vital Signs











Temp Pulse Resp BP Pulse Ox


 


 98.7 F   64   18   154/88   100 


 


 09/21/18 02:44  09/21/18 02:44  09/21/18 02:44  09/21/18 02:44  09/21/18 02:44














- Physical Exam


Comments: 





09/21/18 05:19


General Appearance: Nourished. No Apparent Distress


HEENT: No Pharyngeal Erythema, Tonsillar Exudate, Tonsillar Erythema


Neck: No Cervical Lymphadenopathy


Respiratory/Chest: Lungs Clear, Normal Breath Sounds. No Crackles, Rales, 

Rhonchi, Wheezing


Cardiovascular: Regular Rhythm, Regular Rate. 2/6 Systolic murmur noted on 

exam.  No Gallops, Rubs


Gastrointestinal/Abdominal: Normal Bowel Sounds, Soft. No Guarding, Rebound, 

Tenderness


Musculoskeletal: No CVA Tenderness


Extremity: Normal Capillary Refill


Integumentary: Normal Color, Dry, Warm


Neurologic:  Oriented x2, Alert, Normal Mood/Affect, Normal Response,





**Heart Score/ECG Review


  ** #1


ECG reviewed & interpreted by me at: 05:21


General ECG Interpretation: Sinus Rhythm, Normal Rate, Normal Intervals, No 

acute ischemic changes





ED Treatment Course





- LABORATORY


CBC & Chemistry Diagram: 


 09/21/18 03:32





 09/21/18 03:32





Medical Decision Making





- Medical Decision Making





09/21/18 05:21


The patient is an 85 year old female with a history of HTN, Thyroid Disease who 

presents for evaluation following an unwitnessed fall.  The patient is 

accompanied by family who assists with providing the history.  Differential 

includes but is not limited to: Intracranial process, UTI, ACS, Infectious, 

Metabolic Derangement.  Given the patient's history and physical exam, we will 

obtain a cbc, cmp, troponin, ua, urine culture, chest plain film, ekg, head and 

cervical ct and knee plain film to evaluate further.  We will continue to 

monitor and reassess while here in the ED.





09/21/18 06:41


CBC, cmp, troponin are unremarkable.  Chest plain film is unchanged.  Head and 

cervical ct do not show any acute process as preliminarily read by our on call 

radiologist.  Knee plain film demonstrates osteoarthritic findings.  Given the 

patient's reported syncope, we believe she requires observation admission for 

further monitoring.  We discussed the case with the admitting team who accepted 

the patient for admission.





*DC/Admit/Observation/Transfer


Diagnosis at time of Disposition: 


Syncope


Qualifiers:


 Syncope type: unspecified Qualified Code(s): R55 - Syncope and collapse








- Discharge Dispostion


Condition at time of disposition: Stable


Decision to Admit order: Yes





- Referrals


Referrals: 


Blasie Pineda MD [Primary Care Provider] - 





- Patient Instructions





- Post Discharge Activity

## 2018-09-21 NOTE — ECHO
______________________________________________________________________________



Name: KAMALA MARIN                                    Exam:Adult Echocardiogram

MRN: [id number]         Study Date: 2018 08:46 AM

Age: 85 yrs

______________________________________________________________________________



Reason For Study: SYNCOPE

Height: 65 in        Weight: 165 lb        BSA: 1.8 m2



______________________________________________________________________________



MMode/2D Measurements & Calculations

IVSd: 0.89 cm                                         Ao root diam: 2.5 cm

LVIDd: 4.4 cm                                         LA dimension: 3.3 cm

LVIDs: 3.1 cm                                         ACS: 1.4 cm

LVPWd: 1.1 cm



_______________________________________________________

EDV(Teich): 86.9 ml                                   LVOT diam: 2.1 cm

ESV(Teich): 38.0 ml



Doppler Measurements & Calculations

MV E max avelino: 83.9 cm/sec                                 Ao V2 max: 344.2 cm/sec

MV A max avelino: 59.7 cm/sec                                 Ao max P.4 mmHg

MV E/A: 1.4                                               Ao V2 mean: 232.5 cm/sec

MV dec time: 0.21 sec                                     Ao mean P.2 mmHg

                                                          Ao V2 VTI: 86.0 cm



                                                          VINH(I,D): 1.3 cm2

                                                          AI P1/2t: 414.9 msec

                                                          VINH(V,D): 1.3 cm2



___________________________________________________________

AI max avelino: 532.9 cm/sec                                  LV V1 max P.9 mmHg

AI max P.6 mmHg                                     LV V1 mean PG: 3.5 mmHg

AI dec slope: 376.2 cm/sec2                               LV V1 max: 131.8 cm/sec

                                                          LV V1 mean: 87.7 cm/sec

                                                          LV V1 VTI: 34.0 cm

___________________________________________________________



SV(LVOT): 112.2 ml                                        TR max avelino: 287.8 cm/sec

                                                          TR max P.4 mmHg

___________________________________________________________



Med Peak E' Avelino: 5.8 cm/sec

Med E/e': 14.4

Lat Peak E' Avelino: 9.4 cm/sec

Lat E/e': 8.9



______________________________________________________________________________



Left Ventricle

Upper septal hypertrophy (sigmoid septum), normal variant. The left ventricle is hyperdynamic. Ejecti
on

Fraction = 60-65%.

Right Ventricle

The right ventricle is normal in size and function.

Atria

The left atrium is moderately dilated.

Mitral Valve

There is moderate mitral valve thickening. There is moderate mitral annular calcification. There is n
o mitral

valve stenosis. There is trace to mild mitral regurgitation.

Tricuspid Valve

The tricuspid valve is normal in structure and function. There is moderate tricuspid regurgitation. R
ight

ventricular systolic pressure is elevated at 30-40mmHg.

Aortic Valve

There is moderate to severe aortic sclerosis.;. Moderate to severe valvular aortic stenosis. Mild to 
moderate

aortic regurgitation.

Pulmonic Valve

The pulmonic valve is not well seen, but is grossly normal. There is no pulmonic valvular stenosis. M
ild

pulmonic valvular regurgitation.

Great Vessels

The aortic root is normal size.

Pericardium/Pleura

There is no pericardial effusion.

______________________________________________________________________________





Interpretation Summary

Upper septal hypertrophy (sigmoid septum), normal variant.

The left ventricle is hyperdynamic.

Ejection Fraction = 60-65%.

The right ventricle is normal in size and function.

The left atrium is moderately dilated.

There is moderate mitral valve thickening.

There is moderate mitral annular calcification.

There is trace to mild mitral regurgitation.

There is moderate tricuspid regurgitation.

Right ventricular systolic pressure is elevated at 30-40mmHg.

Moderate to severe valvular aortic stenosis.

Mild to moderate aortic regurgitation.

Mild pulmonic valvular regurgitation.

There is no pericardial effusion.





MD Jett Waters 2018 11:07 AM

## 2018-09-21 NOTE — CONSULT
Consult - text type





- Consultation


Consultation Note: 





Neurology 





History of Present Illness


85 year old female with a history of HTN, Thyroid Disease who presented for 

reported unwitnessed fall.  The patient was reportedly accompanied by family 

who assisted and provided history.  They reported that the patient had an 

unwitnessed fall earlier the morning of admission with possible head trauma.  

The patient stated that she does not remember the fall nor how she fell.  The 

patient's family noted that the patient was appearing more altered prior to her 

fall, although she had returned to her baseline.  She completed a noncontrast 

head CT which I reviewed in detail and did not show any acute abnormalities. 

she also completed CT of cervical spine which did not show any significant 

fractures or dislocations.  During my evaluation, she was completing an echo 

and is being evaluated for syncopal workup.  She had similar presentation in 

the past and had seen her before.  She had even completed MRI of the brain 

previously which did not show acute changes.  She does not have any focal 

deficits although she does have some confusion regarding location (is on 

Aricept for cognitive impairment) and I would continue medical optimization.





Past History





- Past Medical History


Allergies/Adverse Reactions: 


 Allergies











Allergy/AdvReac Type Severity Reaction Status Date / Time


 


No Known Allergies Allergy   Unverified 07/06/17 15:43











Home Medications: 


Ambulatory Orders





Aspirin [ASA -] 81 mg PO DAILY 11/01/16 


Diltiazem [Cardizem -] 180 mg PO BID 11/01/16 


Donepezil HCl 5 mg PO HS 11/01/16 


Furosemide 60 mg PO DAILY 11/01/16 


Hydralazine HCl 50 mg PO TID 11/01/16 


Isosorbide Mononitrate [Imdur -] 60 mg PO BID 11/01/16 


Levothyroxine [Synthroid -] 50 mcg PO DAILY 11/01/16 


Losartan Potassium 100 mg PO DAILY 11/01/16 


Hydroxychloroquine So4 [Plaquenil -] 200 mg PO DAILY 07/06/17 


Risedronate Sodium 150 mg PO MONTHLY 07/06/17 








Anemia: No


Asthma: No


Cancer: No


Cardiac Disorders: No


CVA: No


COPD: No


CHF: No


Dementia: No


Diabetes: No


GI Disorders: No


 Disorders: No


HTN: Yes


Hypercholesterolemia: No


Liver Disease: No


Seizures: No


Thyroid Disease: Yes


Lung CA: No (interstitial lung disease)





- Immunization History


Immunization Up to Date: Yes





- Suicide/Smoking/Psychosocial Hx


Smoking History: Never smoked


Have you smoked in the past 12 months: No


Information on smoking cessation initiated: No


Hx Alcohol Use: No


Drug/Substance Use Hx: No


Substance Use Type: None





**Review of Systems


Constitutional: No fevers, chills, fatigue, malaise


HEENT: No Rhinorrhea, nasal congestion, visual changes


Cardiovascular: Syncope.  No chest pain, palpitations, lightheadedness


Respiratory: No Cough, SOB, Hemoptysis,


Gastrointestinal: No Abdominal pain, Nausea, Vomiting, Constipation, Diarrhea, 

Melena


Genitourinary: No Dysuria, Frequency, Urgency, Hesitancy, Hematuria, Flank pain


Musculoskeletal: No Myalgia, arthralgia


Skin: No rashes, itching, bruising, pallor


Neurologic: No Headache, Dizziness, Numbness, Weakness, or Tingling


Psychiatric: No Hallucinations. No SI or HI








*Physical Exam


 Vital Signs











 Period  Temp  Pulse  Resp  BP Sys/Alan  Pulse Ox


 


 Last 24 Hr  98.7 F  64  18-18  154/88  100-100











General Appearance: Nourished. No Apparent Distress


HEENT: No Pharyngeal Erythema, Tonsillar Exudate, Tonsillar Erythema


Neck: No Cervical Lymphadenopathy


Respiratory/Chest: Lungs Clear, Normal Breath Sounds. No Crackles, Rales, 

Rhonchi, Wheezing


Cardiovascular: Regular Rhythm, Regular Rate. 2/6 Systolic murmur noted on 

exam.  No Gallops, Rubs


Gastrointestinal/Abdominal: Normal Bowel Sounds, Soft. No Guarding, Rebound, 

Tenderness


Musculoskeletal: No CVA Tenderness


Extremity: Normal Capillary Refill


Integumentary: Normal Color, Dry, Warm


Neurologic:  Awake, alert, interactive, does not know location, CN intact, 

strenght grossly symmetric and equal, sensory intact, gait deferred








 CBCD











WBC  8.2 K/mm3 (4.0-10.0)   09/21/18  03:32    


 


RBC  3.94 M/mm3 (3.60-5.2)   09/21/18  03:32    


 


Hgb  10.9 GM/dL (10.7-15.3)   09/21/18  03:32    


 


Hct  31.4 % (32.4-45.2)  L  09/21/18  03:32    


 


MCV  79.9 fl (80-96)  L  09/21/18  03:32    


 


MCHC  34.6 g/dl (32.0-36.0)   09/21/18  03:32    


 


RDW  20.1 % (11.6-15.6)  H  09/21/18  03:32    


 


Plt Count  201 K/MM3 (134-434)  D 09/21/18  03:32    


 


MPV  8.8 fl (7.5-11.1)   09/21/18  03:32    








 CMP











Sodium  142 mmol/L (136-145)   09/21/18  03:32    


 


Potassium  4.0 mmol/L (3.5-5.1)   09/21/18  03:32    


 


Chloride  107 mmol/L ()   09/21/18  03:32    


 


Carbon Dioxide  27 mmol/L (21-32)   09/21/18  03:32    


 


Anion Gap  9 MMOL/L (8-16)   09/21/18  03:32    


 


BUN  41 mg/dL (7-18)  H  09/21/18  03:32    


 


Creatinine  1.3 mg/dL (0.55-1.3)   09/21/18  03:32    


 


Creat Clearance w eGFR  38.93  (>60)   09/21/18  03:32    


 


Random Glucose  90 mg/dL ()   09/21/18  03:32    


 


Calcium  9.0 mg/dL (8.5-10.1)   09/21/18  03:32    


 


Total Bilirubin  0.5 mg/dL (0.2-1)   09/21/18  03:32    


 


AST  33 U/L (15-37)   09/21/18  03:32    


 


ALT  22 U/L (13-61)   09/21/18  03:32    


 


Alkaline Phosphatase  91 U/L ()   09/21/18  03:32    


 


Total Protein  9.3 g/dl (6.4-8.2)  H  09/21/18  03:32    


 


Albumin  3.4 g/dl (3.4-5.0)   09/21/18  03:32    








 CARDIAC ENZYMES











Creatine Kinase  440 IU/L ()  H  09/21/18  03:32    


 


Troponin I  < 0.02 ng/ml (0.00-0.05)   09/21/18  03:32    








CT head reviewed


CT C spine reviewed





Plan:


85 year old female with a history of HTN, Thyroid Disease who presented for 

reported unwitnessed fall.  The patient was reportedly accompanied by family 

who assisted and provided history.  They reported that the patient had an 

unwitnessed fall earlier the morning of admission with possible head trauma.  

The patient stated that she does not remember the fall nor how she fell.  The 

patient's family noted that the patient was appearing more altered prior to her 

fall, although she had returned to her baseline.  She completed a noncontrast 

head CT which I reviewed in detail and did not show any acute abnormalities. 

she also completed CT of cervical spine which did not show any significant 

fractures or dislocations.  During my evaluation, she was completing an echo 

and is being evaluated for syncopal workup.  She had similar presentation in 

the past and had seen her before.  She had even completed MRI of the brain 

previously which did not show acute changes.  She does not have any focal 

deficits although she does have some confusion regarding location (is on 

Aricept for cognitive impairment) and I would continue medical optimization. 

Continue aspirin 81 mg, monitor blood pressure, maintain normotensive range, 

cardiac evaluation, follow-up echo. No focal deficits to require further 

imaging of brain. Consider carotid Dopplers, hydration recommended, can 

continue Aricept at current dose for now. Fall precautions recommended.

## 2018-09-21 NOTE — HP
Admitting History and Physical





- Primary Care Physician


PCP: Blaise Pineda





- Admission


History of Present Illness: 


 is an 85 year old female who was admitted for evaluation of 

unwitnessed fall. Pt unable to recall the fall. Upon speaking with daughter, 

she reports mom was found on the floor conscious by family member yesterday 

morning. Daughter denies mom to report any symptoms. She noticed mom's 

ambulation status has declined.  Pt currently in bed in no acute distress, 

denies chest pain, sob, n/v/d, slurred speech, fever/chills, dysuria.


History Source: Patient


Limitations to Obtaining History: Poor Historian





- Past Medical History


CNS: Yes: TIA, Other (cognitive impairment)


Cardiovascular: Yes: Aortic Stenosis, CHF, HTN, Murmur


Pulmonary: Yes: Other (ILD)


Heme/Onc: Yes: Anemia


Musculoskeletal: Yes: Osteoarthritis


Rheumatology: Yes: Lupus


Endocrine: Yes: Hypothyroidism





- Smoking History


Smoking history: Never smoked


Have you smoked in the past 12 months: No





- Alcohol/Substance Use


Hx Alcohol Use: No


History of Substance Use: reports: None





- Social History


Usual Living Arrangement: Yes: With Child


ADL: Family Assistance


Occupation: former nurse's aide


History of Recent Travel: No





Home Medications





- Allergies


Allergies/Adverse Reactions: 


 Allergies











Allergy/AdvReac Type Severity Reaction Status Date / Time


 


No Known Allergies Allergy   Unverified 07/06/17 15:43














- Home Medications


Home Medications: 


Ambulatory Orders





Aspirin [ASA -] 81 mg PO DAILY 11/01/16 


Diltiazem [Cardizem -] 180 mg PO BID 11/01/16 


Donepezil HCl 5 mg PO HS 11/01/16 


Furosemide 60 mg PO DAILY 11/01/16 


Hydralazine HCl 50 mg PO TID 11/01/16 


Isosorbide Mononitrate [Imdur -] 60 mg PO BID 11/01/16 


Levothyroxine [Synthroid -] 50 mcg PO DAILY 11/01/16 


Losartan Potassium 100 mg PO DAILY 11/01/16 


Hydroxychloroquine So4 [Plaquenil -] 200 mg PO BID 07/06/17 


Risedronate Sodium 150 mg PO MONTHLY 07/06/17 











Family Disease History





- Family Disease History


Family History: Unable to Obtain (Mental Status- forgetful)





Physical Examination


Vital Signs: 


 Vital Signs











Temperature  98.7 F   09/21/18 02:44


 


Pulse Rate  72   09/21/18 07:15


 


Respiratory Rate  16   09/21/18 07:15


 


Blood Pressure  145/68   09/21/18 07:15


 


O2 Sat by Pulse Oximetry (%)  99   09/21/18 07:15











Constitutional: Yes: Well Nourished, No Distress


Cardiovascular: Yes: Regular Rate and Rhythm, Murmur


Respiratory: Yes: WNL, Regular, CTA Bilaterally.  No: Stridor, Tachypnea


Gastrointestinal: Yes: WNL, Normal Bowel Sounds, Soft.  No: Distention, 

Tenderness


Renal/: Yes: WNL


Edema: Yes


Edema: LLE: 1+, RLE: Trace


Neurological: Yes: Alert, Confusion (intermittent)


Psychiatric: Yes: WNL, Alert


Labs: 


 CBC, BMP





 09/21/18 03:32 





 09/21/18 03:32 











Imaging





- Results


Chest X-ray: Report Reviewed


X-ray: Pending (knee)


Cat Scan: Report Reviewed (head, cervical spine unremarkable)


Ultrasound: Pending (carotid us)


EKG: Report Reviewed (nsr)





Problem List





- Problems


(1) Syncope


Assessment/Plan: 


unwitnessed fall, found on the floor, pt unable to recall


suspect possible mechanical or 2/2 dehydration/orthostatic hypotension


cardiac echo without acute findings , trop neg 


UA neg


carotid us pending 


orthostatic vs ordered


tele monitoring 


PT- ambulated 15ft


possible SNF placement 


Code(s): R55 - SYNCOPE AND COLLAPSE   


Qualifiers: 


   Syncope type: unspecified   Qualified Code(s): R55 - Syncope and collapse   





(2) KALE (acute kidney injury)


Assessment/Plan: 


mild, pre renal 


IVF


encourage po intake


monitor 


Code(s): N17.9 - ACUTE KIDNEY FAILURE, UNSPECIFIED   





(3) Hypertension


Assessment/Plan: 


controlled


continue home meds 


Code(s): I10 - ESSENTIAL (PRIMARY) HYPERTENSION   


Qualifiers: 


   Hypertension type: essential hypertension   Qualified Code(s): I10 - 

Essential (primary) hypertension   





(4) Hypothyroid


Assessment/Plan: 


stable


continue synthroid


outpt f/u 


Code(s): E03.9 - HYPOTHYROIDISM, UNSPECIFIED   





(5) CHF (congestive heart failure)


Assessment/Plan: 


stable, minimal le edema 


on lasix 60mg at home


d/c on 40mg lasix upon d/c


cardiology consult appreciated 


Code(s): I50.9 - HEART FAILURE, UNSPECIFIED   


Qualifiers: 


   Heart failure type: diastolic   Heart failure chronicity: chronic   

Qualified Code(s): I50.32 - Chronic diastolic (congestive) heart failure   





(6) Aortic stenosis


Assessment/Plan: 


stable on echo today


continue cardiology f/u


Code(s): I35.0 - NONRHEUMATIC AORTIC (VALVE) STENOSIS   





(7) Cognitive impairment


Assessment/Plan: 


stable


continue aricept


neurology following


Code(s): R41.89 - OTH SYMPTOMS AND SIGNS W COGNITIVE FUNCTIONS AND AWARENESS   





(8) Lupus


Assessment/Plan: 


stable


continue plaquenil bid


outpt rheum f/u 


Code(s): L93.0 - DISCOID LUPUS ERYTHEMATOSUS   





(9) Anemia


Assessment/Plan: 


h/h stable


per outpt records, chronic 


continue iron supplements 


Code(s): D64.9 - ANEMIA, UNSPECIFIED   





Assessment/Plan


Dispo: unsafe d/c home at the moment, pt ambulated 15 ft w/ PT. SNF placement

## 2018-09-21 NOTE — CON.CARD
Cardiology Consult (text)





- Consultation


Consultation Note: 


cc:  unwitnessed fall





hpi:  85 f hx htn, hypothyroid, sle, AS, dchf/le edema, tia 2016, dementia here 

with unwitnessed fall.  Pt poor historian, does not know what happened or why 

she is in hospital.  Per charts she was at home and family heard a thud and 

went to her room and found her awake on floor.  Pt feels well now.  No cp, sob, 

palps, dizzy, pnd, orthopnea.  Mild chronic le edema.  Sees me for cardio.





pmh: per hpi


psh: nc


social: no tob


fam: no premature cad, scd


ros: per hpi; no fever, nvd, cough, nasal congestion, ha, vision changes, gib, 

hematuria, muscle pain


meds:


 





  Current Medications











Generic Name Dose Route Start Last Admin





  Trade Name Freq  PRN Reason Stop Dose Admin


 


Aspirin  81 mg  09/22/18 10:00  





  Asa -  PO   





  DAILY ULICES   





     





     





     





     


 


Diltiazem HCl  180 mg  09/21/18 11:22  





  Cardizem -  PO   





  BID ULICES   





     





     





     





     


 


Donepezil HCl  5 mg  09/21/18 22:00  





  Aricept -  PO   





  HS ULICES   





     





     





     





     


 


Sodium Chloride  1,000 mls @ 50 mls/hr  09/21/18 11:15  





  Normal Saline -  IV  09/22/18 11:09  





  ASDIR ULICES   





     





     





     





     


 


Levothyroxine Sodium  50 mcg  09/22/18 07:00  





  Synthroid -  PO   





  ACBK ULICES   





     





     





     





     











pe:


  Vital Signs











 Period  Temp  Pulse  Resp  BP Sys/Alan  Pulse Ox


 


 Last 24 Hr  98.7 F  64-72  16-18  145-154/68-88  








nad no jvd


rrr s1s2 +as murmur, no rg


cta bl nl eff


alert awake appropriate


trace le edema, no c/c


abd nt nd pos bs


no jaundice diaphoresis


pos dp pt no carotid bruits





 


  Laboratory Last Values











WBC  8.2 K/mm3 (4.0-10.0)   09/21/18  03:32    


 


RBC  3.94 M/mm3 (3.60-5.2)   09/21/18  03:32    


 


Hgb  10.9 GM/dL (10.7-15.3)   09/21/18  03:32    


 


Hct  31.4 % (32.4-45.2)  L  09/21/18  03:32    


 


MCV  79.9 fl (80-96)  L  09/21/18  03:32    


 


MCH  27.7 pg (25.7-33.7)   09/21/18  03:32    


 


MCHC  34.6 g/dl (32.0-36.0)   09/21/18  03:32    


 


RDW  20.1 % (11.6-15.6)  H  09/21/18  03:32    


 


Plt Count  201 K/MM3 (134-434)  D 09/21/18  03:32    


 


MPV  8.8 fl (7.5-11.1)   09/21/18  03:32    


 


Absolute Neuts (auto)  5.9 K/mm3 (1.5-8.0)   09/21/18  03:32    


 


Neutrophils %  72.3 % (42.8-82.8)  D 09/21/18  03:32    


 


Lymphocytes %  13.0 % (8-40)  D 09/21/18  03:32    


 


Monocytes %  9.5 % (3.8-10.2)   09/21/18  03:32    


 


Eosinophils %  4.0 % (0-4.5)   09/21/18  03:32    


 


Basophils %  1.2 % (0-2.0)   09/21/18  03:32    


 


Nucleated RBC %  0 % (0-0)   09/21/18  03:32    


 


Sodium  142 mmol/L (136-145)   09/21/18  03:32    


 


Potassium  4.0 mmol/L (3.5-5.1)   09/21/18  03:32    


 


Chloride  107 mmol/L ()   09/21/18  03:32    


 


Carbon Dioxide  27 mmol/L (21-32)   09/21/18  03:32    


 


Anion Gap  9 MMOL/L (8-16)   09/21/18  03:32    


 


BUN  41 mg/dL (7-18)  H  09/21/18  03:32    


 


Creatinine  1.3 mg/dL (0.55-1.3)   09/21/18  03:32    


 


Creat Clearance w eGFR  38.93  (>60)   09/21/18  03:32    


 


Random Glucose  90 mg/dL ()   09/21/18  03:32    


 


Calcium  9.0 mg/dL (8.5-10.1)   09/21/18  03:32    


 


Total Bilirubin  0.5 mg/dL (0.2-1)   09/21/18  03:32    


 


AST  33 U/L (15-37)   09/21/18  03:32    


 


ALT  22 U/L (13-61)   09/21/18  03:32    


 


Alkaline Phosphatase  91 U/L ()   09/21/18  03:32    


 


Creatine Kinase  440 IU/L ()  H  09/21/18  03:32    


 


Creatine Kinase Index  0.7 % (0.0-5.0)   09/21/18  03:32    


 


CK-MB (CK-2)  3.3 ng/mL (0.5-3.6)   09/21/18  03:32    


 


Troponin I  < 0.02 ng/ml (0.00-0.05)   09/21/18  03:32    


 


Total Protein  9.3 g/dl (6.4-8.2)  H  09/21/18  03:32    


 


Albumin  3.4 g/dl (3.4-5.0)   09/21/18  03:32    


 


Urine Color  Ltyellow   09/21/18  07:50    


 


Urine Appearance  Clear   09/21/18  07:50    


 


Urine pH  6.0  (5.0-8.0)   09/21/18  07:50    


 


Ur Specific Gravity  1.017  (1.001-1.035)   09/21/18  07:50    


 


Urine Protein  Negative  (NEGATIVE)   09/21/18  07:50    


 


Urine Glucose (UA)  Negative  (NEGATIVE)   09/21/18  07:50    


 


Urine Ketones  Negative  (NEGATIVE)   09/21/18  07:50    


 


Urine Blood  Negative  (NEGATIVE)   09/21/18  07:50    


 


Urine Nitrite  Negative  (NEGATIVE)   09/21/18  07:50    


 


Urine Bilirubin  Negative  (<2.0 mg/dL)   09/21/18  07:50    


 


Urine Urobilinogen  Negative mg/dL (0.2-1.0)   09/21/18  07:50    


 


Ur Leukocyte Esterase  Negative  (NEGATIVE)   09/21/18  07:50    











ecg:  sr, pacs, nl intervals, no ischemic changes





cxr: no chf





echo 9/2016:  nl lv/rv, mild lae, mild ar, mod AS, mac, mild tr, mild phtn





echo 11/2016:  nl lv/rv, mod gustavo, rvsp 40-50, mod tr, mod as, mild-mod ar, mild-

mod pr





echo 9/2018:  nl lv/rv, mod lae, mild pr, mod tr, rvsp 30-40, mod ar, mod-sev 

as (gradients and valve area c/w mod AS)





event monitor (2 weeks) 9/2016:  benign, occ pacs, pvcs





carotids 7/2015:  small plaque, no sig stenosis





carotids 11/2016:  small plaque, no sig stenosis





mibi 8/2015:  no ischemia











a/p:  85 f hx htn, hypothyroid, sle, AS, dchf/le edema, tia 2016, dementia here 

with unwitnessed fall.








fall:


-no details available, possibly mechanical


-check orthostatics, monitor tele


-echo today no sig change from prior


-no signs acs or chf


-PT eval





htn:


-cont home meds





AS:


-11/2016 echo with moderate AS,  current echo reports mod-sev AS but gradients 

and valve area consistent with moderate AS and similar to prior echo from 11/ 2016, so no significant change.  Outpt monitoring.





chronic diastolic chf, le edema:


-stable, no gross vol overload. was on lasix po 60 qd at home but here with 

mild anna, resume lasix at lower dose 40 qd when dc

## 2018-09-21 NOTE — PDOC
Attending Attestation





- Resident


Resident Name: Melvin Anguiano





- ED Attending Attestation


I have performed the following: I have examined & evaluated the patient, The 

case was reviewed & discussed with the resident, I agree w/resident's findings 

& plan, Exceptions are as noted





- HPI


HPI: 





09/21/18 06:48


85-year-old female with a history of hypertension, dementia, thyroid disease 

presents to the emergency Department after an unwitnessed fall at home. The 

patient is a poor historian, however family member reports that the patient was 

acting confused. Patient's family member states they heard a thud and went up 

and found the patient on the ground awake. It's unknown if she lost 

consciousness or hit her head as patient cannot remember. Not on AC





- Physicial Exam


PE: 





09/21/18 05:31


agree with resident exam





- Medical Decision Making





09/21/18 06:50


85-year-old female with a history of hypertension, dementia, thyroid disorder 

to the emergency department with an unwitnessed fall and possible syncope. 

Vitals, exam, EKG unremarkable. Labs within normal limits. CT head with no 

acute findings, however with dilated ventricles and possible normal pressure 

hydrocephalus. Patient has been admitted for monitoring and further management.





**Heart Score/ECG Review


  ** #1





09/21/18 05:32


Twelve-lead EKG was performed and reviewed by me. Sinus rhythm, rate 70 to. 

Normal axis. No ST elevations. T wave flattening in leads 1, 2, 3, aVF, V6. 

Biphasic T waves in V3 to V5.When compared to EKG from July 2017, no 

significant changes.

## 2018-09-22 LAB
CHOLEST SERPL-MCNC: 185 MG/DL (ref 50–200)
HDLC SERPL-MCNC: 87 MG/DL (ref 40–60)
TRIGL SERPL-MCNC: 34 MG/DL (ref 0–150)

## 2018-09-22 RX ADMIN — LEVOTHYROXINE SODIUM SCH MCG: 50 TABLET ORAL at 06:18

## 2018-09-22 RX ADMIN — HYDRALAZINE HYDROCHLORIDE SCH MG: 50 TABLET, FILM COATED ORAL at 21:24

## 2018-09-22 RX ADMIN — DONEPEZIL HYDROCHLORIDE SCH MG: 5 TABLET, FILM COATED ORAL at 21:25

## 2018-09-22 RX ADMIN — HYDROXYCHLOROQUINE SULFATE SCH MG: 200 TABLET, FILM COATED ORAL at 09:18

## 2018-09-22 RX ADMIN — LOSARTAN POTASSIUM SCH MG: 50 TABLET, FILM COATED ORAL at 09:18

## 2018-09-22 RX ADMIN — ISOSORBIDE MONONITRATE SCH MG: 60 TABLET, EXTENDED RELEASE ORAL at 21:24

## 2018-09-22 RX ADMIN — HYDRALAZINE HYDROCHLORIDE SCH MG: 50 TABLET, FILM COATED ORAL at 05:17

## 2018-09-22 RX ADMIN — ISOSORBIDE MONONITRATE SCH MG: 60 TABLET, EXTENDED RELEASE ORAL at 09:18

## 2018-09-22 RX ADMIN — ASPIRIN 81 MG SCH MG: 81 TABLET ORAL at 09:18

## 2018-09-22 RX ADMIN — HYDRALAZINE HYDROCHLORIDE SCH MG: 50 TABLET, FILM COATED ORAL at 13:36

## 2018-09-22 NOTE — PN
Progress Note (short form)





- Note


Progress Note: 





cc:  unwitnessed fall





s: no cp, palps, dizzy, dyspnea. stable edema.





o:


 Current Medications





Aspirin (Asa -)  81 mg PO DAILY [facility]


   Last Admin: 09/22/18 09:18 Dose:  81 mg


Diltiazem HCl (Cardizem Cd -)  180 mg PO BID [facility]


   Last Admin: 09/22/18 09:18 Dose:  180 mg


Donepezil HCl (Aricept -)  5 mg PO HS [facility]


   Last Admin: 09/21/18 21:00 Dose:  5 mg


Hydralazine HCl (Apresoline -)  50 mg PO TID [facility]


   Last Admin: 09/22/18 05:17 Dose:  50 mg


Hydroxychloroquine Sulfate (Plaquenil -)  200 mg PO DAILY [facility]


   Last Admin: 09/22/18 09:18 Dose:  200 mg


Sodium Chloride (Normal Saline -)  1,000 mls @ 50 mls/hr IV ASDIR [facility]


   Stop: 09/22/18 11:09


   Last Admin: 09/21/18 15:36 Dose:  50 mls/hr


Isosorbide Mononitrate (Imdur -)  60 mg PO BID [facility]


   Last Admin: 09/22/18 09:18 Dose:  60 mg


Levothyroxine Sodium (Synthroid -)  50 mcg PO ACBK [facility]


   Last Admin: 09/22/18 06:18 Dose:  50 mcg


Losartan Potassium (Cozaar -)  100 mg PO DAILY [facility]


   Last Admin: 09/22/18 09:18 Dose:  100 mg








  


 Vital Signs











 Period  Temp  Pulse  Resp  BP Sys/Alan  Pulse Ox


 


 Last 24 Hr  98.3 F-99.1 F  58-70  16-18  141-179/73-85  98











nad no jvd


rrr s1s2 +as murmur, no rg


cta bl nl eff


alert awake appropriate


trace le edema, no c/c


abd nt nd pos bs


no jaundice diaphoresis


pos dp pt no carotid bruits





 


  Laboratory Last Values











WBC  8.2 K/mm3 (4.0-10.0)   09/21/18  03:32    


 


RBC  3.94 M/mm3 (3.60-5.2)   09/21/18  03:32    


 


Hgb  10.9 GM/dL (10.7-15.3)   09/21/18  03:32    


 


Hct  31.4 % (32.4-45.2)  L  09/21/18  03:32    


 


MCV  79.9 fl (80-96)  L  09/21/18  03:32    


 


MCH  27.7 pg (25.7-33.7)   09/21/18  03:32    


 


MCHC  34.6 g/dl (32.0-36.0)   09/21/18  03:32    


 


RDW  20.1 % (11.6-15.6)  H  09/21/18  03:32    


 


Plt Count  201 K/MM3 (134-434)  D 09/21/18  03:32    


 


MPV  8.8 fl (7.5-11.1)   09/21/18  03:32    


 


Absolute Neuts (auto)  5.9 K/mm3 (1.5-8.0)   09/21/18  03:32    


 


Neutrophils %  72.3 % (42.8-82.8)  D 09/21/18  03:32    


 


Lymphocytes %  13.0 % (8-40)  D 09/21/18  03:32    


 


Monocytes %  9.5 % (3.8-10.2)   09/21/18  03:32    


 


Eosinophils %  4.0 % (0-4.5)   09/21/18  03:32    


 


Basophils %  1.2 % (0-2.0)   09/21/18  03:32    


 


Nucleated RBC %  0 % (0-0)   09/21/18  03:32    


 


Sodium  142 mmol/L (136-145)   09/21/18  03:32    


 


Potassium  4.0 mmol/L (3.5-5.1)   09/21/18  03:32    


 


Chloride  107 mmol/L ()   09/21/18  03:32    


 


Carbon Dioxide  27 mmol/L (21-32)   09/21/18  03:32    


 


Anion Gap  9 MMOL/L (8-16)   09/21/18  03:32    


 


BUN  41 mg/dL (7-18)  H  09/21/18  03:32    


 


Creatinine  1.3 mg/dL (0.55-1.3)   09/21/18  03:32    


 


Creat Clearance w eGFR  38.93  (>60)   09/21/18  03:32    


 


Random Glucose  90 mg/dL ()   09/21/18  03:32    


 


Calcium  9.0 mg/dL (8.5-10.1)   09/21/18  03:32    


 


Total Bilirubin  0.5 mg/dL (0.2-1)   09/21/18  03:32    


 


AST  33 U/L (15-37)   09/21/18  03:32    


 


ALT  22 U/L (13-61)   09/21/18  03:32    


 


Alkaline Phosphatase  91 U/L ()   09/21/18  03:32    


 


Creatine Kinase  440 IU/L ()  H  09/21/18  03:32    


 


Creatine Kinase Index  0.7 % (0.0-5.0)   09/21/18  03:32    


 


CK-MB (CK-2)  3.3 ng/mL (0.5-3.6)   09/21/18  03:32    


 


Troponin I  < 0.02 ng/ml (0.00-0.05)   09/21/18  03:32    


 


Total Protein  9.3 g/dl (6.4-8.2)  H  09/21/18  03:32    


 


Albumin  3.4 g/dl (3.4-5.0)   09/21/18  03:32    


 


Urine Color  Ltyellow   09/21/18  07:50    


 


Urine Appearance  Clear   09/21/18  07:50    


 


Urine pH  6.0  (5.0-8.0)   09/21/18  07:50    


 


Ur Specific Gravity  1.017  (1.001-1.035)   09/21/18  07:50    


 


Urine Protein  Negative  (NEGATIVE)   09/21/18  07:50    


 


Urine Glucose (UA)  Negative  (NEGATIVE)   09/21/18  07:50    


 


Urine Ketones  Negative  (NEGATIVE)   09/21/18  07:50    


 


Urine Blood  Negative  (NEGATIVE)   09/21/18  07:50    


 


Urine Nitrite  Negative  (NEGATIVE)   09/21/18  07:50    


 


Urine Bilirubin  Negative  (<2.0 mg/dL)   09/21/18  07:50    


 


Urine Urobilinogen  Negative mg/dL (0.2-1.0)   09/21/18  07:50    


 


Ur Leukocyte Esterase  Negative  (NEGATIVE)   09/21/18  07:50    











ecg:  sr, pacs, nl intervals, no ischemic changes





cxr: no chf





echo 9/2016:  nl lv/rv, mild lae, mild ar, mod AS, mac, mild tr, mild phtn





echo 11/2016:  nl lv/rv, mod gustavo, rvsp 40-50, mod tr, mod as, mild-mod ar, mild-

mod pr





echo 9/2018:  nl lv/rv, mod lae, mild pr, mod tr, rvsp 30-40, mod ar, mod-sev 

as (gradients and valve area c/w mod AS)





event monitor (2 weeks) 9/2016:  benign, occ pacs, pvcs





carotids 7/2015:  small plaque, no sig stenosis





carotids 11/2016:  small plaque, no sig stenosis





mibi 8/2015:  no ischemia











a/p:  85 f hx htn, hypothyroid, sle, AS, dchf/le edema, tia 2016, dementia here 

with unwitnessed fall.








fall:


-no details available, possibly mechanical


-check orthostatics, monitoring on tele


-echo unchanged from prior


- carotid ultrasound report pending


-no signs acs or chf


-PT eval


- stable from cardiac perspective





htn:


-cont home meds





AS:


-11/2016 echo with moderate AS,  current echo reports mod-sev AS but gradients 

and valve area consistent with moderate AS and similar to prior echo from 11/ 2016, so no significant change.  Outpt monitoring.





chronic diastolic chf, le edema:


-stable, no gross vol overload. was on lasix po 60 qd at home but here with 

mild anna, resume lasix at lower dose 40 qd when dc

## 2018-09-22 NOTE — PN
Progress Note, Physician


Chief Complaint: 





Non  new complaints


History of Present Illness: 


85 year old female H/O Dementia, HTN, AS CHF who was admitted for evaluation of 

unwitnessed fall. Pt unable to recall the fall. 





- Current Medication List


Current Medications: 


Active Medications





Aspirin (Asa -)  81 mg PO DAILY [facility]


   Last Admin: 09/22/18 09:18 Dose:  81 mg


Diltiazem HCl (Cardizem Cd -)  180 mg PO BID [facility]


   Last Admin: 09/22/18 09:18 Dose:  180 mg


Donepezil HCl (Aricept -)  5 mg PO HS [facility]


   Last Admin: 09/21/18 21:00 Dose:  5 mg


Hydralazine HCl (Apresoline -)  50 mg PO TID [facility]


   Last Admin: 09/22/18 05:17 Dose:  50 mg


Hydroxychloroquine Sulfate (Plaquenil -)  200 mg PO DAILY [facility]


   Last Admin: 09/22/18 09:18 Dose:  200 mg


Isosorbide Mononitrate (Imdur -)  60 mg PO BID [facility]


   Last Admin: 09/22/18 09:18 Dose:  60 mg


Levothyroxine Sodium (Synthroid -)  50 mcg PO ACBK [facility]


   Last Admin: 09/22/18 06:18 Dose:  50 mcg


Losartan Potassium (Cozaar -)  100 mg PO DAILY [facility]


   Last Admin: 09/22/18 09:18 Dose:  100 mg











- Objective


Vital Signs: 


 Vital Signs











Temperature  98 F   09/22/18 10:00


 


Pulse Rate  70   09/22/18 10:00


 


Respiratory Rate  18   09/22/18 10:00


 


Blood Pressure  140/68   09/22/18 10:00


 


O2 Sat by Pulse Oximetry (%)  98   09/22/18 09:00




















Constitutional:  Well Nourished, No Distress


HEENT: Mm moist, no anemia, PERRLA EOMI


NECK: No JVd No bruit, no thyromegally , supple.


Respiratory: Regular, CTA Bilaterally.  No: Stridor, Tachypnea


Cardiovascular:  Regular Rate and Rhythm, SM in AA


Gastrointestinal: Normal Bowel Sounds, Soft.  No: Distention, Tenderness


EXTERMITIES: No edema feet, Pulses +, No Claf tenderness


Neurological:  Alert, mild Demetia, non focal neuro exam











Extremities: Yes: Calf Tenderness


Peripheral Pulses WNL: Yes


Labs: 


 CBC, BMP





 09/21/18 03:32 





 09/21/18 03:32 











- ....Imaging


Other: Report Reviewed (ECHO: mederate AS)





Problem List





- Problems


(1) Fall


Assessment/Plan: 


Unwitnessed fall w/u for TIA/Syncope evaluated by neurology anfd cardiology, no 

acute St T changes, ECHO moderate AS, no arrhythmia on monitor


Code(s): W19.XXXA - UNSPECIFIED FALL, INITIAL ENCOUNTER   





(2) Aortic stenosis


Assessment/Plan: 


Moderate no significant  hemodynamic abnormality unlikely cause for syncope


Code(s): I35.0 - NONRHEUMATIC AORTIC (VALVE) STENOSIS   





(3) Hypertension


Assessment/Plan: 


Well controlled cont all home meds


Code(s): I10 - ESSENTIAL (PRIMARY) HYPERTENSION   


Qualifiers: 


   Hypertension type: essential hypertension   Qualified Code(s): I10 - 

Essential (primary) hypertension   





(4) Hypothyroid


Assessment/Plan: 


On Levothyroxine TSH at target


Code(s): E03.9 - HYPOTHYROIDISM, UNSPECIFIED   





(5) Lupus


Assessment/Plan: 


On chloroquine


Code(s): L93.0 - DISCOID LUPUS ERYTHEMATOSUS   





(6) Anemia


Assessment/Plan: 


Chronic H/H stable


Code(s): D64.9 - ANEMIA, UNSPECIFIED   





(7) Cognitive impairment


Assessment/Plan: 


no acute psychosis or johan satble


Code(s): R41.89 - OTH SYMPTOMS AND SIGNS W COGNITIVE FUNCTIONS AND AWARENESS

## 2018-09-23 RX ADMIN — DONEPEZIL HYDROCHLORIDE SCH MG: 5 TABLET, FILM COATED ORAL at 21:23

## 2018-09-23 RX ADMIN — ISOSORBIDE MONONITRATE SCH MG: 60 TABLET, EXTENDED RELEASE ORAL at 21:23

## 2018-09-23 RX ADMIN — LOSARTAN POTASSIUM SCH MG: 50 TABLET, FILM COATED ORAL at 09:44

## 2018-09-23 RX ADMIN — HYDRALAZINE HYDROCHLORIDE SCH MG: 50 TABLET, FILM COATED ORAL at 16:11

## 2018-09-23 RX ADMIN — HYDRALAZINE HYDROCHLORIDE SCH MG: 50 TABLET, FILM COATED ORAL at 06:08

## 2018-09-23 RX ADMIN — ASPIRIN 81 MG SCH MG: 81 TABLET ORAL at 09:44

## 2018-09-23 RX ADMIN — HYDRALAZINE HYDROCHLORIDE SCH MG: 50 TABLET, FILM COATED ORAL at 21:23

## 2018-09-23 RX ADMIN — ISOSORBIDE MONONITRATE SCH MG: 60 TABLET, EXTENDED RELEASE ORAL at 09:44

## 2018-09-23 RX ADMIN — LEVOTHYROXINE SODIUM SCH MCG: 50 TABLET ORAL at 06:09

## 2018-09-23 RX ADMIN — HYDROXYCHLOROQUINE SULFATE SCH MG: 200 TABLET, FILM COATED ORAL at 09:44

## 2018-09-23 NOTE — PN
Progress Note (short form)





- Note


Progress Note: 





cc:  unwitnessed fall





s: no cp, palps, dizzy, dyspnea. stable edema.





tele: sinus, PACs, PVCs





o:


  Current Medications





Aspirin (Asa -)  81 mg PO DAILY [facility]


   Last Admin: 09/23/18 09:44 Dose:  81 mg


Diltiazem HCl (Cardizem Cd -)  180 mg PO BID [facility]


   Last Admin: 09/23/18 09:44 Dose:  180 mg


Donepezil HCl (Aricept -)  5 mg PO HS [facility]


   Last Admin: 09/22/18 21:25 Dose:  5 mg


Hydralazine HCl (Apresoline -)  50 mg PO TID [facility]


   Last Admin: 09/23/18 06:08 Dose:  50 mg


Hydroxychloroquine Sulfate (Plaquenil -)  200 mg PO DAILY [facility]


   Last Admin: 09/23/18 09:44 Dose:  200 mg


Isosorbide Mononitrate (Imdur -)  60 mg PO BID [facility]


   Last Admin: 09/23/18 09:44 Dose:  60 mg


Levothyroxine Sodium (Synthroid -)  50 mcg PO ACBK [facility]


   Last Admin: 09/23/18 06:09 Dose:  50 mcg


Losartan Potassium (Cozaar -)  100 mg PO DAILY [facility]


   Last Admin: 09/23/18 09:44 Dose:  100 mg











  


  Vital Signs











 Period  Temp  Pulse  Resp  BP Sys/Alan  Pulse Ox


 


 Last 24 Hr  98.3 F-99.1 F  64-79  18-20  108-159/49-67  98











nad no jvd


rrr s1s2 +as murmur, no rg


cta bl nl eff


alert awake appropriate


trace le edema, no c/c


abd nt nd pos bs


no jaundice diaphoresis


pos dp pt no carotid bruits





 


  


ecg:  sr, pacs, nl intervals, no ischemic changes





cxr: no chf





echo 9/2016:  nl lv/rv, mild lae, mild ar, mod AS, mac, mild tr, mild phtn





echo 11/2016:  nl lv/rv, mod gustavo, rvsp 40-50, mod tr, mod as, mild-mod ar, mild-

mod pr





echo 9/2018:  nl lv/rv, mod lae, mild pr, mod tr, rvsp 30-40, mod ar, mod-sev 

as (gradients and valve area c/w mod AS)





event monitor (2 weeks) 9/2016:  benign, occ pacs, pvcs





carotids 7/2015:  small plaque, no sig stenosis





carotids 11/2016:  small plaque, no sig stenosis





mibi 8/2015:  no ischemia











a/p:  85 f hx htn, hypothyroid, sle, AS, dchf/le edema, tia 2016, dementia here 

with unwitnessed fall.








fall:


-no details available, possibly mechanical


-check orthostatics, monitoring on tele


-echo unchanged from prior


- carotid ultrasound report pending


-no signs acs or chf


-PT eval


- stable from cardiac perspective





htn:


-cont home meds





AS:


-11/2016 echo with moderate AS,  current echo reports mod-sev AS but gradients 

and valve area consistent with moderate AS and similar to prior echo from 11/ 2016, so no significant change.  Outpt monitoring.





chronic diastolic chf, le edema:


-stable, no gross vol overload. was on lasix po 60 qd at home but here with 

mild anna, resume lasix at lower dose 40 qd when dc

## 2018-09-23 NOTE — PN
Progress Note, Physician


Chief Complaint: 





Non  new complaints


History of Present Illness: 


85 year old female H/O Dementia, HTN, AS CHF who was admitted for evaluation of 

unwitnessed fall. Pt unable to recall the fall. 





- Current Medication List


Current Medications: 


Active Medications





Aspirin (Asa -)  81 mg PO DAILY [facility]


   Last Admin: 09/23/18 09:44 Dose:  81 mg


Diltiazem HCl (Cardizem Cd -)  180 mg PO BID [facility]


   Last Admin: 09/23/18 09:44 Dose:  180 mg


Donepezil HCl (Aricept -)  5 mg PO HS [facility]


   Last Admin: 09/22/18 21:25 Dose:  5 mg


Hydralazine HCl (Apresoline -)  50 mg PO TID [facility]


   Last Admin: 09/23/18 06:08 Dose:  50 mg


Hydroxychloroquine Sulfate (Plaquenil -)  200 mg PO DAILY [facility]


   Last Admin: 09/23/18 09:44 Dose:  200 mg


Isosorbide Mononitrate (Imdur -)  60 mg PO BID [facility]


   Last Admin: 09/23/18 09:44 Dose:  60 mg


Levothyroxine Sodium (Synthroid -)  50 mcg PO ACBK [facility]


   Last Admin: 09/23/18 06:09 Dose:  50 mcg


Losartan Potassium (Cozaar -)  100 mg PO DAILY [facility]


   Last Admin: 09/23/18 09:44 Dose:  100 mg











- Objective


Vital Signs: 


 Vital Signs











Temperature  98.5 F   09/23/18 05:46


 


Pulse Rate  64   09/23/18 05:46


 


Respiratory Rate  18   09/23/18 05:46


 


Blood Pressure  158/67   09/23/18 05:46


 


O2 Sat by Pulse Oximetry (%)  98   09/22/18 20:42




















Constitutional:  Well Nourished, No Distress


HEENT: Mm moist, no anemia, PERRLA EOMI


NECK: No JVd No bruit, no thyromegally , supple.


Respiratory: Regular, CTA Bilaterally.  No: Stridor, Tachypnea


Cardiovascular:  Regular Rate and Rhythm, SM in AA


Gastrointestinal: Normal Bowel Sounds, Soft.  No: Distention, Tenderness


EXTERMITIES: No edema feet, Pulses +, No Claf tenderness


Neurological:  Alert, mild Dementia, non focal neuro exam








Labs: 


 CBC, BMP





 09/21/18 03:32 





 09/21/18 03:32 











Problem List





- Problems


(1) Fall


Assessment/Plan: 


Unwitnessed fall w/u for TIA/Syncope evaluated by neurology anfd cardiology, no 

acute St T changes, ECHO moderate AS, no arrhythmia on monitor


Code(s): W19.XXXA - UNSPECIFIED FALL, INITIAL ENCOUNTER   





(2) Aortic stenosis


Assessment/Plan: 


Moderate no significant  hemodynamic abnormality unlikely cause for syncope


Code(s): I35.0 - NONRHEUMATIC AORTIC (VALVE) STENOSIS   





(3) Hypertension


Assessment/Plan: 


Well controlled cont all home meds


Code(s): I10 - ESSENTIAL (PRIMARY) HYPERTENSION   


Qualifiers: 


   Hypertension type: essential hypertension   Qualified Code(s): I10 - 

Essential (primary) hypertension   





(4) Hypothyroid


Assessment/Plan: 


On Levothyroxine TSH at target


Code(s): E03.9 - HYPOTHYROIDISM, UNSPECIFIED   





(5) Anemia


Assessment/Plan: 


Chronic H/H stable


Code(s): D64.9 - ANEMIA, UNSPECIFIED   





(6) Cognitive impairment


Assessment/Plan: 


no acute psychosis or johan satble


Code(s): R41.89 - OTH SYMPTOMS AND SIGNS W COGNITIVE FUNCTIONS AND AWARENESS   





(7) Lupus


Assessment/Plan: 


On chloroquine


Code(s): L93.0 - DISCOID LUPUS ERYTHEMATOSUS   





(8) KALE (acute kidney injury)


Assessment/Plan: 


cont hydration improving


Code(s): N17.9 - ACUTE KIDNEY FAILURE, UNSPECIFIED

## 2018-09-24 LAB
ANION GAP SERPL CALC-SCNC: 6 MMOL/L (ref 8–16)
BUN SERPL-MCNC: 32 MG/DL (ref 7–18)
CALCIUM SERPL-MCNC: 8.3 MG/DL (ref 8.5–10.1)
CHLORIDE SERPL-SCNC: 113 MMOL/L (ref 98–107)
CO2 SERPL-SCNC: 25 MMOL/L (ref 21–32)
CREAT SERPL-MCNC: 0.9 MG/DL (ref 0.55–1.3)
GLUCOSE SERPL-MCNC: 116 MG/DL (ref 74–106)
POTASSIUM SERPLBLD-SCNC: 4 MMOL/L (ref 3.5–5.1)
SODIUM SERPL-SCNC: 144 MMOL/L (ref 136–145)

## 2018-09-24 RX ADMIN — HYDRALAZINE HYDROCHLORIDE SCH MG: 50 TABLET, FILM COATED ORAL at 06:09

## 2018-09-24 RX ADMIN — LOSARTAN POTASSIUM SCH MG: 50 TABLET, FILM COATED ORAL at 11:27

## 2018-09-24 RX ADMIN — DONEPEZIL HYDROCHLORIDE SCH MG: 5 TABLET, FILM COATED ORAL at 23:25

## 2018-09-24 RX ADMIN — HYDROXYCHLOROQUINE SULFATE SCH MG: 200 TABLET, FILM COATED ORAL at 11:28

## 2018-09-24 RX ADMIN — ISOSORBIDE MONONITRATE SCH MG: 60 TABLET, EXTENDED RELEASE ORAL at 23:25

## 2018-09-24 RX ADMIN — ISOSORBIDE MONONITRATE SCH MG: 60 TABLET, EXTENDED RELEASE ORAL at 11:27

## 2018-09-24 RX ADMIN — LEVOTHYROXINE SODIUM SCH MCG: 50 TABLET ORAL at 06:46

## 2018-09-24 RX ADMIN — POLYETHYLENE GLYCOL 3350 SCH: 17 POWDER, FOR SOLUTION ORAL at 23:23

## 2018-09-24 RX ADMIN — SENNOSIDES SCH: 8.6 TABLET, FILM COATED ORAL at 23:25

## 2018-09-24 RX ADMIN — HYDRALAZINE HYDROCHLORIDE SCH MG: 50 TABLET, FILM COATED ORAL at 13:57

## 2018-09-24 RX ADMIN — HYDRALAZINE HYDROCHLORIDE SCH MG: 50 TABLET, FILM COATED ORAL at 23:25

## 2018-09-24 RX ADMIN — POLYETHYLENE GLYCOL 3350 SCH GM: 17 POWDER, FOR SOLUTION ORAL at 17:47

## 2018-09-24 RX ADMIN — ASPIRIN 81 MG SCH MG: 81 TABLET ORAL at 11:27

## 2018-09-24 NOTE — PN
Progress Note, Physician


Chief Complaint: 


Pt laying in bed in no acute distress. Reports feeling well. Denies any chest 

pain, sob, n/v/d





- Current Medication List


Current Medications: 


Active Medications





Aspirin (Asa -)  81 mg PO DAILY [facility]


   Last Admin: 09/24/18 11:27 Dose:  81 mg


Bisacodyl (Dulcolax -)  20 mg PO ONCE ONE


   Stop: 09/24/18 12:02


Diltiazem HCl (Cardizem Cd -)  180 mg PO BID [facility]


   Last Admin: 09/24/18 11:27 Dose:  180 mg


Donepezil HCl (Aricept -)  5 mg PO HS [facility]


   Last Admin: 09/23/18 21:23 Dose:  5 mg


Hydralazine HCl (Apresoline -)  50 mg PO TID [facility]


   Last Admin: 09/24/18 06:09 Dose:  50 mg


Hydroxychloroquine Sulfate (Plaquenil -)  200 mg PO DAILY [facility]


   Last Admin: 09/24/18 11:28 Dose:  200 mg


Isosorbide Mononitrate (Imdur -)  60 mg PO BID [facility]


   Last Admin: 09/24/18 11:27 Dose:  60 mg


Levothyroxine Sodium (Synthroid -)  50 mcg PO ACBK [facility]


   Last Admin: 09/24/18 06:46 Dose:  50 mcg


Losartan Potassium (Cozaar -)  100 mg PO DAILY [facility]


   Last Admin: 09/24/18 11:27 Dose:  100 mg


Polyethylene Glycol (Miralax (For Daily Use) -)  17 gm PO BID [facility]











- Objective


Vital Signs: 


 Vital Signs











Temperature  98.6 F   09/24/18 09:00


 


Pulse Rate  79   09/24/18 09:00


 


Respiratory Rate  18   09/24/18 09:00


 


Blood Pressure  146/84   09/24/18 09:00


 


O2 Sat by Pulse Oximetry (%)  97   09/23/18 21:00











Constitutional: Yes: Well Nourished, No Distress, Calm


Cardiovascular: Yes: Regular Rate and Rhythm


Respiratory: Yes: WNL, Regular, CTA Bilaterally.  No: Accessory Muscle Use, SOB

, Tachypnea, Wheezes


Gastrointestinal: Yes: Normal Bowel Sounds, Soft.  No: Distention, Tenderness


Musculoskeletal: Yes: Joint Swelling (Left knee)


Neurological: Yes: Alert, Oriented


Psychiatric: Yes: Alert


Labs: 


 CBC, BMP





 09/21/18 03:32 





 09/21/18 03:32 











Problem List





- Problems


(1) Syncope


Code(s): R55 - SYNCOPE AND COLLAPSE   


Qualifiers: 


   Syncope type: unspecified   Qualified Code(s): R55 - Syncope and collapse   





(2) KALE (acute kidney injury)


Code(s): N17.9 - ACUTE KIDNEY FAILURE, UNSPECIFIED   





(3) Hypertension


Code(s): I10 - ESSENTIAL (PRIMARY) HYPERTENSION   


Qualifiers: 


   Hypertension type: essential hypertension   Qualified Code(s): I10 - 

Essential (primary) hypertension   





(4) Hypothyroid


Code(s): E03.9 - HYPOTHYROIDISM, UNSPECIFIED   





(5) CHF (congestive heart failure)


Code(s): I50.9 - HEART FAILURE, UNSPECIFIED   


Qualifiers: 


   Heart failure type: diastolic   Heart failure chronicity: chronic   

Qualified Code(s): I50.32 - Chronic diastolic (congestive) heart failure   





(6) Aortic stenosis


Code(s): I35.0 - NONRHEUMATIC AORTIC (VALVE) STENOSIS   





(7) Cognitive impairment


Code(s): R41.89 - OTH SYMPTOMS AND SIGNS W COGNITIVE FUNCTIONS AND AWARENESS   





(8) Lupus


Code(s): L93.0 - DISCOID LUPUS ERYTHEMATOSUS   





(9) Anemia


Code(s): D64.9 - ANEMIA, UNSPECIFIED   





(10) Effusion, left knee


Code(s): M25.462 - EFFUSION, LEFT KNEE   





Assessment/Plan


(1) Syncope


Assessment/Plan: 


unwitnessed fall at home


cardiac/neuro/infectious etiology ruled out 


Left knee xray- large effusion, no fx, ortho consulted 


PT- ambulated 15ft


SNF placement 


Code(s): R55 - SYNCOPE AND COLLAPSE   


Qualifiers: 


   Syncope type: unspecified   Qualified Code(s): R55 - Syncope and collapse   





(2) Effusion, left knee


Assessment/Plan: 


as above


ortho following- aspiration if family agrees 


awaiting family decision and ortho clearance 


Code(s): M25.462 - EFFUSION, LEFT KNEE   





(3) KALE (acute kidney injury)


Assessment/Plan: 


improved 


encourage po intake


monitor 


Code(s): N17.9 - ACUTE KIDNEY FAILURE, UNSPECIFIED   





(4) Hypertension


Assessment/Plan: 


controlled


continue home meds 


Code(s): I10 - ESSENTIAL (PRIMARY) HYPERTENSION   


Qualifiers: 


   Hypertension type: essential hypertension   Qualified Code(s): I10 - 

Essential (primary) hypertension   





(5) Hypothyroid


Assessment/Plan: 


stable


continue synthroid


outpt f/u 


Code(s): E03.9 - HYPOTHYROIDISM, UNSPECIFIED   





(6) CHF (congestive heart failure)


Assessment/Plan: 


stable, minimal le edema 


on lasix 60mg at home


d/c on 40mg lasix upon d/c


cardiology consult appreciated 


Code(s): I50.9 - HEART FAILURE, UNSPECIFIED   


Qualifiers: 


   Heart failure type: diastolic   Heart failure chronicity: chronic   

Qualified Code(s): I50.32 - Chronic diastolic (congestive) heart failure   





(7) Aortic stenosis


Assessment/Plan: 


stable on echo 


continue cardiology f/u


Code(s): I35.0 - NONRHEUMATIC AORTIC (VALVE) STENOSIS   





(8) Cognitive impairment


Assessment/Plan: 


stable


continue aricept


neurology following


Code(s): R41.89 - OTH SYMPTOMS AND SIGNS W COGNITIVE FUNCTIONS AND AWARENESS   





(9) Lupus


Assessment/Plan: 


stable


continue plaquenil bid


outpt rheum f/u 


Code(s): L93.0 - DISCOID LUPUS ERYTHEMATOSUS   





(10) Anemia


Assessment/Plan: 


h/h stable


per outpt records, chronic 


continue iron supplements 


Code(s): D64.9 - ANEMIA, UNSPECIFIED 





Dispo: SNF, pending ortho clearance

## 2018-09-24 NOTE — PN
Progress Note (short form)





- Note


Progress Note: 








                  Neurology 





History of Present Illness


85 year old female with a history of HTN, Thyroid Disease who presented for 

reported unwitnessed fall.  The patient was reportedly accompanied by family 

who assisted and provided history.  They reported that the patient had an 

unwitnessed fall earlier the morning of admission with possible head trauma.  

The patient stated that she does not remember the fall nor how she fell.  The 

patient's family noted that the patient was appearing more altered prior to her 

fall, although she had returned to her baseline.  She completed a noncontrast 

head CT which I reviewed in detail and did not show any acute abnormalities. 

she also completed CT of cervical spine which did not show any significant 

fractures or dislocations.  She had similar presentation in the past and had 

seen her before.  She had even completed MRI of the brain previously which did 

not show acute changes.  She completed Carotid doppler which were reviewed and 

without high grade stenosis. Echo also completed, cardiology following, LV 

hyperdynamic, EF 60-65%. Knows she's in the hosptial but can't provide date. 





Allergies/Adverse Reactions: 


 Allergies











Allergy/AdvReac Type Severity Reaction Status Date / Time


 


No Known Allergies Allergy   Unverified 07/06/17 15:43











Active Medications





Aspirin (Asa -)  81 mg PO DAILY [facility]


   Last Admin: 09/23/18 09:44 Dose:  81 mg


Diltiazem HCl (Cardizem Cd -)  180 mg PO BID [facility]


   Last Admin: 09/23/18 21:23 Dose:  180 mg


Donepezil HCl (Aricept -)  5 mg PO HS [facility]


   Last Admin: 09/23/18 21:23 Dose:  5 mg


Hydralazine HCl (Apresoline -)  50 mg PO TID [facility]


   Last Admin: 09/24/18 06:09 Dose:  50 mg


Hydroxychloroquine Sulfate (Plaquenil -)  200 mg PO DAILY [facility]


   Last Admin: 09/23/18 09:44 Dose:  200 mg


Isosorbide Mononitrate (Imdur -)  60 mg PO BID [facility]


   Last Admin: 09/23/18 21:23 Dose:  60 mg


Levothyroxine Sodium (Synthroid -)  50 mcg PO ACBK [facility]


   Last Admin: 09/24/18 06:46 Dose:  50 mcg


Losartan Potassium (Cozaar -)  100 mg PO DAILY [facility]


   Last Admin: 09/23/18 09:44 Dose:  100 mg

















*Physical Exam            


                  Vital Signs











 Period  Temp  Pulse  Resp  BP Sys/Alan  Pulse Ox


 


 Last 24 Hr  98 F-98.9 F  66-82  17-20  121-145/54-75  97











General Appearance: Nourished. No Apparent Distress


HEENT: No Pharyngeal Erythema, Tonsillar Exudate, Tonsillar Erythema


Neck: No Cervical Lymphadenopathy


Respiratory/Chest: Lungs Clear, Normal Breath Sounds. No Crackles, Rales, 

Rhonchi, Wheezing


Cardiovascular: Regular Rhythm, Regular Rate. 2/6 Systolic murmur noted on 

exam.  No Gallops, Rubs


Gastrointestinal/Abdominal: Normal Bowel Sounds, Soft. No Guarding, Rebound, 

Tenderness


Musculoskeletal: No CVA Tenderness


Extremity: Normal Capillary Refill


Integumentary: Normal Color, Dry, Warm


Neurologic:  Awake, alert, interactive, able to tell location, does not know 

date, CN intact, strenght grossly symmetric and equal, sensory intact, gait 

deferred








  CBCD











WBC  8.2 K/mm3 (4.0-10.0)   09/21/18  03:32    


 


RBC  3.94 M/mm3 (3.60-5.2)   09/21/18  03:32    


 


Hgb  10.9 GM/dL (10.7-15.3)   09/21/18  03:32    


 


Hct  31.4 % (32.4-45.2)  L  09/21/18  03:32    


 


MCV  79.9 fl (80-96)  L  09/21/18  03:32    


 


MCHC  34.6 g/dl (32.0-36.0)   09/21/18  03:32    


 


RDW  20.1 % (11.6-15.6)  H  09/21/18  03:32    


 


Plt Count  201 K/MM3 (134-434)  D 09/21/18  03:32    


 


MPV  8.8 fl (7.5-11.1)   09/21/18  03:32    








 CMP











Sodium  142 mmol/L (136-145)   09/21/18  03:32    


 


Potassium  4.0 mmol/L (3.5-5.1)   09/21/18  03:32    


 


Chloride  107 mmol/L ()   09/21/18  03:32    


 


Carbon Dioxide  27 mmol/L (21-32)   09/21/18  03:32    


 


Anion Gap  9 MMOL/L (8-16)   09/21/18  03:32    


 


BUN  41 mg/dL (7-18)  H  09/21/18  03:32    


 


Creatinine  1.3 mg/dL (0.55-1.3)   09/21/18  03:32    


 


Creat Clearance w eGFR  38.93  (>60)   09/21/18  03:32    


 


Random Glucose  90 mg/dL ()   09/21/18  03:32    


 


Calcium  9.0 mg/dL (8.5-10.1)   09/21/18  03:32    


 


Total Bilirubin  0.5 mg/dL (0.2-1)   09/21/18  03:32    


 


AST  33 U/L (15-37)   09/21/18  03:32    


 


ALT  22 U/L (13-61)   09/21/18  03:32    


 


Alkaline Phosphatase  91 U/L ()   09/21/18  03:32    


 


Total Protein  9.3 g/dl (6.4-8.2)  H  09/21/18  03:32    


 


Albumin  3.4 g/dl (3.4-5.0)   09/21/18  03:32    








 CARDIAC ENZYMES











Creatine Kinase  440 IU/L ()  H  09/21/18  03:32    


 


Troponin I  < 0.02 ng/ml (0.00-0.05)   09/21/18  16:30    














CT head reviewed


CT C spine reviewed


Carotid Doppler reviewed


Echo reviewed





Plan:


85 year old female with a history of HTN, Thyroid Disease who presented for 

reported unwitnessed fall.  The patient was reportedly accompanied by family 

who assisted and provided history.  They reported that the patient had an 

unwitnessed fall earlier the morning of admission with possible head trauma.  

The patient stated that she does not remember the fall nor how she fell.  The 

patient's family noted that the patient was appearing more altered prior to her 

fall, although she had returned to her baseline.  She completed a noncontrast 

head CT which I reviewed in detail and did not show any acute abnormalities. 

she also completed CT of cervical spine which did not show any significant 

fractures or dislocations.  She had similar presentation in the past and had 

seen her before.  She had even completed MRI of the brain previously which did 

not show acute changes.  She does not have any focal deficits and appears to be 

at baseline mental status, knows she's in the hospital but not date. Continue 

aspirin 81 mg, monitor blood pressure, maintain normotensive range, cardiac 

evaluation, follow-up echo. No focal deficits to require further imaging of 

brain. Carotids, Echo reviewed. Can continue Aricept at current dose for now. 

Fall precautions recommended.

## 2018-09-24 NOTE — PN
Progress Note (short form)





- Note


Progress Note: 





cc:  unwitnessed fall





s: no cp, palps, dizzy, dyspnea. stable edema.





tele: sinus, PACs, PVCs





o:


  


 Current Medications





Aspirin (Asa -)  81 mg PO DAILY [facility]


   Last Admin: 09/23/18 09:44 Dose:  81 mg


Diltiazem HCl (Cardizem Cd -)  180 mg PO BID [facility]


   Last Admin: 09/23/18 21:23 Dose:  180 mg


Donepezil HCl (Aricept -)  5 mg PO HS [facility]


   Last Admin: 09/23/18 21:23 Dose:  5 mg


Hydralazine HCl (Apresoline -)  50 mg PO TID [facility]


   Last Admin: 09/24/18 06:09 Dose:  50 mg


Hydroxychloroquine Sulfate (Plaquenil -)  200 mg PO DAILY [facility]


   Last Admin: 09/23/18 09:44 Dose:  200 mg


Isosorbide Mononitrate (Imdur -)  60 mg PO BID [facility]


   Last Admin: 09/23/18 21:23 Dose:  60 mg


Levothyroxine Sodium (Synthroid -)  50 mcg PO ACBK [facility]


   Last Admin: 09/24/18 06:46 Dose:  50 mcg


Losartan Potassium (Cozaar -)  100 mg PO DAILY [facility]


   Last Admin: 09/23/18 09:44 Dose:  100 mg














  Vital Signs











 Period  Temp  Pulse  Resp  BP Sys/Alan  Pulse Ox


 


 Last 24 Hr  98 F-98.9 F  66-82  17-20  121-145/54-75  97











nad no jvd


rrr s1s2 +as murmur, no rg


cta bl nl eff


alert awake appropriate


trace le edema, no c/c


abd nt nd pos bs


no jaundice diaphoresis


pos dp pt no carotid bruits





 


  


ecg:  sr, pacs, nl intervals, no ischemic changes





cxr: no chf





echo 9/2016:  nl lv/rv, mild lae, mild ar, mod AS, mac, mild tr, mild phtn





echo 11/2016:  nl lv/rv, mod gustavo, rvsp 40-50, mod tr, mod as, mild-mod ar, mild-

mod pr





echo 9/2018:  nl lv/rv, mod lae, mild pr, mod tr, rvsp 30-40, mod ar, mod-sev 

as (gradients and valve area c/w mod AS)





event monitor (2 weeks) 9/2016:  benign, occ pacs, pvcs





carotids 7/2015:  small plaque, no sig stenosis





carotids 11/2016:  small plaque, no sig stenosis





mibi 8/2015:  no ischemia











a/p:  85 f hx htn, hypothyroid, sle, AS, dchf/le edema, tia 2016, dementia here 

with unwitnessed fall.








fall:


-no details available, possibly mechanical


-check orthostatics, monitoring on tele


-echo unchanged from prior


- carotid ultrasound report pending


-no signs acs or chf


-PT eval


- stable from cardiac perspective


- dc tele





htn:


-cont home meds





AS:


-11/2016 echo with moderate AS,  current echo reports mod-sev AS but gradients 

and valve area consistent with moderate AS and similar to prior echo from 11/ 2016, so no significant change.  Outpt monitoring.





chronic diastolic chf, le edema:


-stable, no gross vol overload. was on lasix po 60 qd at home but here with 

mild anna, resume lasix at lower dose 40 qd when dc





dc telemetry

## 2018-09-24 NOTE — CON.ORTH
Consult


Reason for Consultation:: left knee pain s/p fall





- Past Medical History


CNS: Yes: TIA, Other (cognitive impairment)


Cardio/Vascular: Yes: Aortic Stenosis, CHF, HTN, Murmur


Pulmonary: Yes: Other (ILD)


Musculoskeletal: Yes: Osteoarthritis


Rheumatology: Yes: Lupus


Endocrine: Yes: Hypothyroidism





- Alcohol/Substance Use


Hx Alcohol Use: No


History of Substance Use: reports: None





- Smoking History


Smoking history: Never smoked


Have you smoked in the past 12 months: No





- Social History


ADL: Family Assistance


Occupation: former nurse's aide


History of Recent Travel: No





Home Medications





- Allergies


Allergies/Adverse Reactions: 


 Allergies











Allergy/AdvReac Type Severity Reaction Status Date / Time


 


No Known Allergies Allergy   Unverified 07/06/17 15:43














- Home Medications


Home Medications: 


Ambulatory Orders





Aspirin [ASA -] 81 mg PO DAILY 11/01/16 


Diltiazem [Cardizem -] 180 mg PO BID 11/01/16 


Donepezil HCl 5 mg PO HS 11/01/16 


Furosemide 60 mg PO DAILY 11/01/16 


Hydralazine HCl 50 mg PO TID 11/01/16 


Isosorbide Mononitrate [Imdur -] 60 mg PO BID 11/01/16 


Levothyroxine [Synthroid -] 50 mcg PO DAILY 11/01/16 


Losartan Potassium 100 mg PO DAILY 11/01/16 


Hydroxychloroquine So4 [Plaquenil -] 200 mg PO BID 07/06/17 


Risedronate Sodium 150 mg PO MONTHLY 07/06/17 











Physical Exam for Ortho


Vital Signs: 


 Vital Signs











Temperature  98.6 F   09/24/18 09:00


 


Pulse Rate  79   09/24/18 09:00


 


Respiratory Rate  18   09/24/18 09:00


 


Blood Pressure  146/84   09/24/18 09:00


 


O2 Sat by Pulse Oximetry (%)  97   09/23/18 21:00











Labs: 


 CBC, BMP





 09/21/18 03:32 





 09/21/18 03:32 











- Lower Extremity


Knee: Yes: Left, Swelling, Tenderness, Other (2+ effusion, mild ttp medially, 

no ttp laterally, able to SLR, rom 0-90, calf soft, nt, nvi)





Imaging





- Results


X-ray: Report Reviewed, Image Reviewed





Assessment/Plan





85-year-old female with a history of hypertension, dementia, thyroid disease 

presents to the emergency Department after an unwitnessed fall at home. We were 

asked to evaluate her left knee to r/o fx.





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

radiologist ? lateral tibial plateau fx however pt has no pain in this location)


PT


wbat


spoke with daughter regarding aspiration of left knee, she would like to speak 

to other family members and will let us know


aspiration may help with pts pain


will follow


d/w Dr. Camacho

## 2018-09-25 PROCEDURE — 3E0U33Z INTRODUCTION OF ANTI-INFLAMMATORY INTO JOINTS, PERCUTANEOUS APPROACH: ICD-10-PCS

## 2018-09-25 RX ADMIN — HYDRALAZINE HYDROCHLORIDE SCH MG: 50 TABLET, FILM COATED ORAL at 21:22

## 2018-09-25 RX ADMIN — DONEPEZIL HYDROCHLORIDE SCH MG: 5 TABLET, FILM COATED ORAL at 21:23

## 2018-09-25 RX ADMIN — ASPIRIN 81 MG SCH MG: 81 TABLET ORAL at 09:46

## 2018-09-25 RX ADMIN — HYDRALAZINE HYDROCHLORIDE SCH MG: 50 TABLET, FILM COATED ORAL at 06:17

## 2018-09-25 RX ADMIN — ISOSORBIDE MONONITRATE SCH MG: 60 TABLET, EXTENDED RELEASE ORAL at 21:23

## 2018-09-25 RX ADMIN — HYDROXYCHLOROQUINE SULFATE SCH MG: 200 TABLET, FILM COATED ORAL at 09:49

## 2018-09-25 RX ADMIN — LOSARTAN POTASSIUM SCH MG: 50 TABLET, FILM COATED ORAL at 09:46

## 2018-09-25 RX ADMIN — HYDRALAZINE HYDROCHLORIDE SCH MG: 50 TABLET, FILM COATED ORAL at 14:30

## 2018-09-25 RX ADMIN — SENNOSIDES SCH TAB: 8.6 TABLET, FILM COATED ORAL at 21:23

## 2018-09-25 RX ADMIN — LEVOTHYROXINE SODIUM SCH MCG: 50 TABLET ORAL at 06:17

## 2018-09-25 RX ADMIN — ISOSORBIDE MONONITRATE SCH MG: 60 TABLET, EXTENDED RELEASE ORAL at 09:46

## 2018-09-25 RX ADMIN — POLYETHYLENE GLYCOL 3350 SCH: 17 POWDER, FOR SOLUTION ORAL at 21:23

## 2018-09-25 RX ADMIN — POLYETHYLENE GLYCOL 3350 SCH GM: 17 POWDER, FOR SOLUTION ORAL at 10:40

## 2018-09-25 NOTE — PN
Progress Note (short form)





- Note


Progress Note: 








                  Neurology 





History of Present Illness


85 year old female with a history of HTN, Thyroid Disease who presented for 

reported unwitnessed fall.  The patient was reportedly accompanied by family 

who assisted and provided history.  They reported that the patient had an 

unwitnessed fall earlier the morning of admission with possible head trauma.  

The patient stated that she does not remember the fall nor how she fell.  The 

patient's family noted that the patient was appearing more altered prior to her 

fall, although she had returned to her baseline.  She completed a noncontrast 

head CT which I reviewed in detail and did not show any acute abnormalities. 

she also completed CT of cervical spine which did not show any significant 

fractures or dislocations.  She had similar presentation in the past and had 

seen her before.  She had even completed MRI of the brain previously which did 

not show acute changes.  She completed Carotid doppler which were reviewed and 

without high grade stenosis. Echo also completed, cardiology following, LV 

hyperdynamic, EF 60-65%. Knows she's in the hosptial but can't provide date. 

Remains neurologically stable and no new events overnight. Being planned for 

discharge. Cardiology note reviewed, tele dc. No new complaints. 





Allergies/Adverse Reactions: 


 Allergies











Allergy/AdvReac Type Severity Reaction Status Date / Time


 


No Known Allergies Allergy   Unverified 07/06/17 15:43











Active Medications





Aspirin (Asa -)  81 mg PO DAILY [facility]


   Last Admin: 09/24/18 11:27 Dose:  81 mg


Diltiazem HCl (Cardizem Cd -)  180 mg PO BID [facility]


   Last Admin: 09/24/18 23:26 Dose:  180 mg


Donepezil HCl (Aricept -)  5 mg PO HS [facility]


   Last Admin: 09/24/18 23:25 Dose:  5 mg


Hydralazine HCl (Apresoline -)  50 mg PO TID [facility]


   Last Admin: 09/25/18 06:17 Dose:  50 mg


Hydroxychloroquine Sulfate (Plaquenil -)  200 mg PO DAILY [facility]


   Last Admin: 09/24/18 11:28 Dose:  200 mg


Isosorbide Mononitrate (Imdur -)  60 mg PO BID [facility]


   Last Admin: 09/24/18 23:25 Dose:  60 mg


Levothyroxine Sodium (Synthroid -)  50 mcg PO ACBK [facility]


   Last Admin: 09/25/18 06:17 Dose:  50 mcg


Losartan Potassium (Cozaar -)  100 mg PO DAILY [facility]


   Last Admin: 09/24/18 11:27 Dose:  100 mg


Polyethylene Glycol (Miralax (For Daily Use) -)  17 gm PO BID [facility]


   Last Admin: 09/24/18 23:23 Dose:  Not Given


Senna (Senna -)  2 tab PO HS [facility]


   Last Admin: 09/24/18 23:25 Dose:  Not Given

















*Physical Exam            


                  


 Vital Signs











 Period  Temp  Pulse  Resp  BP Sys/Alan  Pulse Ox


 


 Last 24 Hr  98.2 F-99.0 F  70-82  18-20  134-149/58-88  96














General Appearance: Nourished. No Apparent Distress


HEENT: No Pharyngeal Erythema, Tonsillar Exudate, Tonsillar Erythema


Neck: No Cervical Lymphadenopathy


Respiratory/Chest: Lungs Clear, Normal Breath Sounds. No Crackles, Rales, 

Rhonchi, Wheezing


Cardiovascular: Regular Rhythm, Regular Rate. 2/6 Systolic murmur noted on 

exam.  No Gallops, Rubs


Gastrointestinal/Abdominal: Normal Bowel Sounds, Soft. No Guarding, Rebound, 

Tenderness


Musculoskeletal: No CVA Tenderness


Extremity: Normal Capillary Refill


Integumentary: Normal Color, Dry, Warm


Neurologic:  Awake, alert, interactive, able to tell location, does not know 

date, CN intact, strenght grossly symmetric and equal, sensory intact, gait 

deferred








  CBCD











WBC  8.2 K/mm3 (4.0-10.0)   09/21/18  03:32    


 


RBC  3.94 M/mm3 (3.60-5.2)   09/21/18  03:32    


 


Hgb  10.9 GM/dL (10.7-15.3)   09/21/18  03:32    


 


Hct  31.4 % (32.4-45.2)  L  09/21/18  03:32    


 


MCV  79.9 fl (80-96)  L  09/21/18  03:32    


 


MCHC  34.6 g/dl (32.0-36.0)   09/21/18  03:32    


 


RDW  20.1 % (11.6-15.6)  H  09/21/18  03:32    


 


Plt Count  201 K/MM3 (134-434)  D 09/21/18  03:32    


 


MPV  8.8 fl (7.5-11.1)   09/21/18  03:32    








 CMP











Sodium  144 mmol/L (136-145)   09/24/18  13:45    


 


Potassium  4.0 mmol/L (3.5-5.1)   09/24/18  13:45    


 


Chloride  113 mmol/L ()  H  09/24/18  13:45    


 


Carbon Dioxide  25 mmol/L (21-32)   09/24/18  13:45    


 


Anion Gap  6 MMOL/L (8-16)  L  09/24/18  13:45    


 


BUN  32 mg/dL (7-18)  H  09/24/18  13:45    


 


Creatinine  0.9 mg/dL (0.55-1.3)   09/24/18  13:45    


 


Creat Clearance w eGFR  59.51  (>60)   09/24/18  13:45    


 


Random Glucose  116 mg/dL ()  H  09/24/18  13:45    


 


Calcium  8.3 mg/dL (8.5-10.1)  L  09/24/18  13:45    


 


Total Bilirubin  0.5 mg/dL (0.2-1)   09/21/18  03:32    


 


AST  33 U/L (15-37)   09/21/18  03:32    


 


ALT  22 U/L (13-61)   09/21/18  03:32    


 


Alkaline Phosphatase  91 U/L ()   09/21/18  03:32    


 


Total Protein  9.3 g/dl (6.4-8.2)  H  09/21/18  03:32    


 


Albumin  3.4 g/dl (3.4-5.0)   09/21/18  03:32    








 CARDIAC ENZYMES











Creatine Kinase  440 IU/L ()  H  09/21/18  03:32    


 


Troponin I  < 0.02 ng/ml (0.00-0.05)   09/21/18  16:30    














CT head reviewed


CT C spine reviewed


Carotid Doppler reviewed


Echo reviewed





Plan:


85 year old female with a history of HTN, Thyroid Disease who presented for 

reported unwitnessed fall.  The patient was reportedly accompanied by family 

who assisted and provided history.  They reported that the patient had an 

unwitnessed fall earlier the morning of admission with possible head trauma.  

The patient stated that she does not remember the fall nor how she fell.  The 

patient's family noted that the patient was appearing more altered prior to her 

fall, although she had returned to her baseline.  She completed a noncontrast 

head CT which I reviewed in detail and did not show any acute abnormalities. 

she also completed CT of cervical spine which did not show any significant 

fractures or dislocations.  She had similar presentation in the past and had 

seen her before.  She had even completed MRI of the brain previously which did 

not show acute changes.  She does not have any focal deficits and appears to be 

at baseline mental status, knows she's in the hospital but not date. Continue 

aspirin 81 mg, monitor blood pressure, maintain normotensive range, cardiac 

evaluation, follow-up echo. No focal deficits to require further imaging of 

brain. Carotids, Echo reviewed. Can continue Aricept at current dose for now. 

Neurologically stable, can follow up as outpatient. Fall precautions 

recommended.

## 2018-09-25 NOTE — PROC
Arthrocentesis





- Arthrocentesis


Indication: Inflammation, Steriod Injection


Arthrocentesis Site: left: knee


Flexion: <20 degrees


Skin prep: Betadine


Anesthesia: 1% Lidocaine


Drainage, Color/Appearance: Clear


Tube Drainage(ml): 100


Joint Injection of: Depo Medrol, Lidocaine 1%


Sterile Dressing Applied: Yes


Remarks: 





consent obtained from daughter- Dr. Shanelle Espinosa, time-out performed, under 

sterile technique left knee was aspirated, 100 cc of clear fluid obtained, depo-

medrol was injected, injection tolerated well, sterile pressure dressing applied

## 2018-09-25 NOTE — PN
Progress Note (short form)





- Note


Progress Note: 





cc:  unwitnessed fall





s: no cp, palps, dizzy, dyspnea. stable edema. no complaints





tele: sinus, PACs, PVCs, NSVT





o:


  


  Current Medications





Aspirin (Asa -)  81 mg PO DAILY [facility]


   Last Admin: 09/25/18 09:46 Dose:  81 mg


Diltiazem HCl (Cardizem Cd -)  180 mg PO BID [facility]


   Last Admin: 09/25/18 09:46 Dose:  180 mg


Donepezil HCl (Aricept -)  5 mg PO HS [facility]


   Last Admin: 09/24/18 23:25 Dose:  5 mg


Hydralazine HCl (Apresoline -)  50 mg PO TID [facility]


   Last Admin: 09/25/18 06:17 Dose:  50 mg


Hydroxychloroquine Sulfate (Plaquenil -)  200 mg PO DAILY [facility]


   Last Admin: 09/25/18 09:49 Dose:  200 mg


Isosorbide Mononitrate (Imdur -)  60 mg PO BID [facility]


   Last Admin: 09/25/18 09:46 Dose:  60 mg


Levothyroxine Sodium (Synthroid -)  50 mcg PO ACBK [facility]


   Last Admin: 09/25/18 06:17 Dose:  50 mcg


Losartan Potassium (Cozaar -)  100 mg PO DAILY [facility]


   Last Admin: 09/25/18 09:46 Dose:  100 mg


Polyethylene Glycol (Miralax (For Daily Use) -)  17 gm PO BID [facility]


   Last Admin: 09/24/18 23:23 Dose:  Not Given


Senna (Senna -)  2 tab PO [facility]


   Last Admin: 09/24/18 23:25 Dose:  Not Given











  Vital Signs











 Period  Temp  Pulse  Resp  BP Sys/Alan  Pulse Ox


 


 Last 24 Hr  98.2 F-99.0 F  70-82  18-20  134-149/58-88  96











nad no jvd


rrr s1s2 +as murmur, no rg


cta bl nl eff


alert awake appropriate


trace le edema, no c/c


abd nt nd pos bs


no jaundice diaphoresis


pos dp pt no carotid bruits





 


  


ecg:  sr, pacs, nl intervals, no ischemic changes





cxr: no chf





echo 9/2016:  nl lv/rv, mild lae, mild ar, mod AS, mac, mild tr, mild phtn





echo 11/2016:  nl lv/rv, mod gustavo, rvsp 40-50, mod tr, mod as, mild-mod ar, mild-

mod pr





echo 9/2018:  nl lv/rv, mod lae, mild pr, mod tr, rvsp 30-40, mod ar, mod-sev 

as (gradients and valve area c/w mod AS)





event monitor (2 weeks) 9/2016:  benign, occ pacs, pvcs





carotids 7/2015:  small plaque, no sig stenosis





carotids 11/2016:  small plaque, no sig stenosis





mibi 8/2015:  no ischemia











a/p:  85 f hx htn, hypothyroid, sle, AS, dchf/le edema, tia 2016, dementia here 

with unwitnessed fall.








fall:


-no details available, possibly mechanical


-check orthostatics, monitoring on tele


-echo unchanged from prior


- carotid ultrasound report no high grade stenosis bilaterally


-no signs acs or chf


-PT eval


- stable from cardiac perspective


- dc tele





htn:


-cont home meds





AS:


-11/2016 echo with moderate AS,  current echo reports mod-sev AS but gradients 

and valve area consistent with moderate AS and similar to prior echo from 11/ 2016, so no significant change.  Outpt monitoring.





chronic diastolic chf, le edema:


-stable, no gross vol overload. was on lasix po 60 qd at home but here with 

mild anna, resume lasix at lower dose 40 qd when dc





dc telemetry

## 2018-09-25 NOTE — DS
Physical Examination


Vital Signs: 


 Vital Signs











Temperature  98.2 F   09/25/18 06:14


 


Pulse Rate  82   09/25/18 06:14


 


Respiratory Rate  20   09/25/18 06:14


 


Blood Pressure  149/74   09/25/18 06:14


 


O2 Sat by Pulse Oximetry (%)  96   09/24/18 21:00











Constitutional: Yes: Well Nourished, No Distress, Calm


Cardiovascular: Yes: Regular Rate and Rhythm, Murmur


Respiratory: Yes: Regular, CTA Bilaterally.  No: Accessory Muscle Use, SOB, 

Tachypnea, Wheezes


Gastrointestinal: Yes: WNL, Normal Bowel Sounds, Soft.  No: Distention, 

Tenderness


Renal/: Yes: WNL


Musculoskeletal: Yes: Joint Swelling (b/l knees)


Neurological: Yes: Alert


Psychiatric: Yes: Alert


Labs: 


 CBC, BMP





 09/21/18 03:32 





 09/24/18 13:45 











Discharge Summary


Reason For Visit: SYNCOPE


Current Active Problems





KALE (acute kidney injury) (Acute)


Anemia (Acute)


Aortic stenosis (Acute)


CHF (congestive heart failure) (Acute)


Cognitive impairment (Acute)


Effusion, left knee (Acute)


Fall (Acute)


Lupus (Acute)


Syncope (Acute)








Hospital Course: 


 is an 85 year old female who was admitted from home for evaluation of 

an unwitnessed fall. All cardiac/neuro/infectious work up negative. Pt found to 

be dehydrated, was taking lasix 60mg at home, improved s/p ivf, lasix reduced 

to 40mg. Cardiology, neurology consults appreciated. Left knee xray reveals 

large effusion, plan for aspiration and corticosteroid injection by Ortho 

today. Pt ambulatd 30 ft w/ PT, poorly tolerated, pt can benefit from SNF 

however insurance denied SNF placement. Pt will highly benefit from more [facility] 

hours for better supervision to prevent further falls. Home PT w/ [facility] 

recommended for rehabilitation. Otherwise, pt is medically stable for discharge 

home. 





32 minutes spent in discharge planning 


Condition: Stable





- Instructions


Diet, Activity, Other Instructions: 


ambulate/diet as tolerated


LASIX 40MG daily 


f/u as directed


Referrals: 


Blaise Pineda MD [Primary Care Provider] - 1 Week


Bailey Harmon MD [Staff Physician] - 2 Weeks


Aaron Marrero MD [Staff Physician] - 


Disposition: [facility]/HOME HEALTH CARE





- Home Medications


Comprehensive Discharge Medication List: 


Ambulatory Orders





Aspirin [ASA -] 81 mg PO DAILY 11/01/16 


Diltiazem [Cardizem -] 180 mg PO BID 11/01/16 


Donepezil HCl 5 mg PO HS 11/01/16 


Hydralazine HCl 50 mg PO TID 11/01/16 


Isosorbide Mononitrate [Imdur -] 60 mg PO BID 11/01/16 


Levothyroxine [Synthroid -] 50 mcg PO DAILY 11/01/16 


Losartan Potassium 100 mg PO DAILY 11/01/16 


Hydroxychloroquine So4 [Plaquenil -] 200 mg PO BID 07/06/17 


Risedronate Sodium 150 mg PO MONTHLY 07/06/17 


Furosemide [Lasix] 40 mg PO DAILY #30 tablet 09/25/18 


Polyethylene Glycol 3350 [Miralax 119 gm Btl -] 17 gm PO BID  bottle 09/25/18 


Sennosides [Senna -] 2 tab PO HS  tablet 09/25/18

## 2018-09-25 NOTE — PN
Progress Note (short form)





- Note


Progress Note: 





Ortho





Pt seen and examined- denies any pain at rest





PE- left knee- 2 + effusion, minimal ttp, rom 0-100, calf soft, nt


nvi








a/p


PT 


wbat


still waiting for family approval for aspiration/injection of cortisone


pain control


ok to d/c from ortho pov


d/w Dr. Camacho

## 2018-09-26 VITALS — DIASTOLIC BLOOD PRESSURE: 90 MMHG | TEMPERATURE: 98.1 F | HEART RATE: 64 BPM | SYSTOLIC BLOOD PRESSURE: 164 MMHG

## 2018-09-26 RX ADMIN — LEVOTHYROXINE SODIUM SCH MCG: 50 TABLET ORAL at 06:03

## 2018-09-26 RX ADMIN — HYDRALAZINE HYDROCHLORIDE SCH MG: 50 TABLET, FILM COATED ORAL at 06:03
